# Patient Record
Sex: FEMALE | Race: WHITE | HISPANIC OR LATINO
[De-identification: names, ages, dates, MRNs, and addresses within clinical notes are randomized per-mention and may not be internally consistent; named-entity substitution may affect disease eponyms.]

---

## 2017-01-22 ENCOUNTER — TRANSCRIPTION ENCOUNTER (OUTPATIENT)
Age: 27
End: 2017-01-22

## 2017-02-21 ENCOUNTER — APPOINTMENT (OUTPATIENT)
Dept: INTERNAL MEDICINE | Facility: CLINIC | Age: 27
End: 2017-02-21

## 2017-02-21 VITALS
DIASTOLIC BLOOD PRESSURE: 58 MMHG | HEART RATE: 83 BPM | SYSTOLIC BLOOD PRESSURE: 102 MMHG | BODY MASS INDEX: 22.22 KG/M2 | WEIGHT: 135 LBS | OXYGEN SATURATION: 98 % | HEIGHT: 65.5 IN | TEMPERATURE: 98.7 F

## 2017-02-21 DIAGNOSIS — Z23 ENCOUNTER FOR IMMUNIZATION: ICD-10-CM

## 2017-02-24 ENCOUNTER — RX RENEWAL (OUTPATIENT)
Age: 27
End: 2017-02-24

## 2017-02-28 LAB
ADJUSTED MITOGEN: >10 IU/ML
ADJUSTED TB AG: 0.02 IU/ML
ALBUMIN SERPL ELPH-MCNC: 4.8 G/DL
ALP BLD-CCNC: 35 U/L
ALT SERPL-CCNC: 15 U/L
ANION GAP SERPL CALC-SCNC: 17 MMOL/L
APPEARANCE: CLEAR
AST SERPL-CCNC: 23 U/L
BASOPHILS # BLD AUTO: 0.02 K/UL
BASOPHILS NFR BLD AUTO: 0.4 %
BILIRUB SERPL-MCNC: 0.3 MG/DL
BILIRUBIN URINE: NEGATIVE
BLOOD URINE: NEGATIVE
BUN SERPL-MCNC: 12 MG/DL
CALCIUM SERPL-MCNC: 9.6 MG/DL
CHLORIDE SERPL-SCNC: 102 MMOL/L
CHOLEST SERPL-MCNC: 223 MG/DL
CHOLEST/HDLC SERPL: 2.3 RATIO
CO2 SERPL-SCNC: 20 MMOL/L
COLOR: YELLOW
CREAT SERPL-MCNC: 0.7 MG/DL
EOSINOPHIL # BLD AUTO: 0.08 K/UL
EOSINOPHIL NFR BLD AUTO: 1.7 %
GLUCOSE QUALITATIVE U: NORMAL MG/DL
GLUCOSE SERPL-MCNC: 67 MG/DL
HBA1C MFR BLD HPLC: 5.5 %
HBV SURFACE AB SER QL: REACTIVE
HCT VFR BLD CALC: 40.2 %
HDLC SERPL-MCNC: 96 MG/DL
HGB BLD-MCNC: 13 G/DL
HIV1+2 AB SPEC QL IA.RAPID: NONREACTIVE
IMM GRANULOCYTES NFR BLD AUTO: 0 %
KETONES URINE: NEGATIVE
LDLC SERPL CALC-MCNC: 112 MG/DL
LEUKOCYTE ESTERASE URINE: NEGATIVE
LYMPHOCYTES # BLD AUTO: 1.94 K/UL
LYMPHOCYTES NFR BLD AUTO: 40.8 %
M TB IFN-G BLD-IMP: NEGATIVE
MAN DIFF?: NORMAL
MCHC RBC-ENTMCNC: 30.4 PG
MCHC RBC-ENTMCNC: 32.3 GM/DL
MCV RBC AUTO: 94.1 FL
MEV IGG FLD QL IA: 32.1 AU/ML
MEV IGG+IGM SER-IMP: POSITIVE
MONOCYTES # BLD AUTO: 0.25 K/UL
MONOCYTES NFR BLD AUTO: 5.3 %
MUV AB SER-ACNC: POSITIVE
MUV IGG SER QL IA: 200 AU/ML
NEUTROPHILS # BLD AUTO: 2.47 K/UL
NEUTROPHILS NFR BLD AUTO: 51.8 %
NITRITE URINE: NEGATIVE
PH URINE: 5.5
PLATELET # BLD AUTO: 224 K/UL
POTASSIUM SERPL-SCNC: 3.9 MMOL/L
PROT SERPL-MCNC: 8 G/DL
PROTEIN URINE: NEGATIVE MG/DL
QUANTIFERON GOLD NIL: 0.04 IU/ML
RBC # BLD: 4.27 M/UL
RBC # FLD: 14.5 %
RUBV IGG FLD-ACNC: 2.1 INDEX
RUBV IGG SER-IMP: POSITIVE
SODIUM SERPL-SCNC: 139 MMOL/L
SPECIFIC GRAVITY URINE: 1.03
T4 FREE SERPL-MCNC: 1.5 NG/DL
TRIGL SERPL-MCNC: 75 MG/DL
TSH SERPL-ACNC: 1.18 UIU/ML
UROBILINOGEN URINE: NORMAL MG/DL
VZV AB TITR SER: POSITIVE
VZV IGG SER IF-ACNC: 180.7 INDEX
WBC # FLD AUTO: 4.76 K/UL

## 2017-03-02 ENCOUNTER — LABORATORY RESULT (OUTPATIENT)
Age: 27
End: 2017-03-02

## 2017-03-02 ENCOUNTER — OUTPATIENT (OUTPATIENT)
Dept: OUTPATIENT SERVICES | Facility: HOSPITAL | Age: 27
LOS: 1 days | End: 2017-03-02
Payer: MEDICAID

## 2017-03-02 ENCOUNTER — APPOINTMENT (OUTPATIENT)
Dept: OBGYN | Facility: CLINIC | Age: 27
End: 2017-03-02

## 2017-03-02 VITALS — DIASTOLIC BLOOD PRESSURE: 70 MMHG | BODY MASS INDEX: 22.12 KG/M2 | SYSTOLIC BLOOD PRESSURE: 120 MMHG | WEIGHT: 135 LBS

## 2017-03-02 DIAGNOSIS — N76.0 ACUTE VAGINITIS: ICD-10-CM

## 2017-03-02 PROCEDURE — G0463: CPT

## 2017-03-02 PROCEDURE — 86592 SYPHILIS TEST NON-TREP QUAL: CPT

## 2017-03-02 PROCEDURE — 87340 HEPATITIS B SURFACE AG IA: CPT

## 2017-03-02 PROCEDURE — 86803 HEPATITIS C AB TEST: CPT

## 2017-03-02 PROCEDURE — 87389 HIV-1 AG W/HIV-1&-2 AB AG IA: CPT

## 2017-03-03 DIAGNOSIS — Z11.3 ENCOUNTER FOR SCREENING FOR INFECTIONS WITH A PREDOMINANTLY SEXUAL MODE OF TRANSMISSION: ICD-10-CM

## 2017-03-03 DIAGNOSIS — Z30.40 ENCOUNTER FOR SURVEILLANCE OF CONTRACEPTIVES, UNSPECIFIED: ICD-10-CM

## 2017-03-03 LAB
C TRACH RRNA SPEC QL NAA+PROBE: SIGNIFICANT CHANGE UP
HBV SURFACE AG SER-ACNC: SIGNIFICANT CHANGE UP
HCV AB S/CO SERPL IA: 0.17 S/CO — SIGNIFICANT CHANGE UP
HCV AB SERPL-IMP: SIGNIFICANT CHANGE UP
HIV 1+2 AB+HIV1 P24 AG SERPL QL IA: SIGNIFICANT CHANGE UP
N GONORRHOEA RRNA SPEC QL NAA+PROBE: SIGNIFICANT CHANGE UP
RPR SERPL-ACNC: SIGNIFICANT CHANGE UP
SPECIMEN SOURCE: SIGNIFICANT CHANGE UP

## 2017-04-25 ENCOUNTER — MEDICATION RENEWAL (OUTPATIENT)
Age: 27
End: 2017-04-25

## 2017-05-23 ENCOUNTER — RX RENEWAL (OUTPATIENT)
Age: 27
End: 2017-05-23

## 2017-07-05 ENCOUNTER — APPOINTMENT (OUTPATIENT)
Dept: DERMATOLOGY | Facility: CLINIC | Age: 27
End: 2017-07-05

## 2017-07-05 VITALS
HEIGHT: 65.5 IN | BODY MASS INDEX: 22.22 KG/M2 | WEIGHT: 135 LBS | DIASTOLIC BLOOD PRESSURE: 60 MMHG | SYSTOLIC BLOOD PRESSURE: 110 MMHG

## 2017-07-05 DIAGNOSIS — R23.8 OTHER SKIN CHANGES: ICD-10-CM

## 2017-07-05 DIAGNOSIS — L72.9 FOLLICULAR CYST OF THE SKIN AND SUBCUTANEOUS TISSUE, UNSPECIFIED: ICD-10-CM

## 2017-07-17 ENCOUNTER — MEDICATION RENEWAL (OUTPATIENT)
Age: 27
End: 2017-07-17

## 2017-07-31 ENCOUNTER — TRANSCRIPTION ENCOUNTER (OUTPATIENT)
Age: 27
End: 2017-07-31

## 2017-08-29 ENCOUNTER — APPOINTMENT (OUTPATIENT)
Dept: DERMATOLOGY | Facility: CLINIC | Age: 27
End: 2017-08-29

## 2017-09-14 ENCOUNTER — APPOINTMENT (OUTPATIENT)
Dept: DERMATOLOGY | Facility: CLINIC | Age: 27
End: 2017-09-14
Payer: MEDICAID

## 2017-09-14 VITALS — DIASTOLIC BLOOD PRESSURE: 66 MMHG | SYSTOLIC BLOOD PRESSURE: 110 MMHG

## 2017-09-14 DIAGNOSIS — Z78.9 OTHER SPECIFIED HEALTH STATUS: ICD-10-CM

## 2017-09-14 PROCEDURE — 99213 OFFICE O/P EST LOW 20 MIN: CPT

## 2017-12-15 ENCOUNTER — APPOINTMENT (OUTPATIENT)
Dept: DERMATOLOGY | Facility: CLINIC | Age: 27
End: 2017-12-15
Payer: MEDICAID

## 2017-12-15 VITALS — SYSTOLIC BLOOD PRESSURE: 110 MMHG | DIASTOLIC BLOOD PRESSURE: 72 MMHG

## 2017-12-15 DIAGNOSIS — Z86.69 PERSONAL HISTORY OF OTHER DISEASES OF THE NERVOUS SYSTEM AND SENSE ORGANS: ICD-10-CM

## 2017-12-15 DIAGNOSIS — Z87.2 PERSONAL HISTORY OF DISEASES OF THE SKIN AND SUBCUTANEOUS TISSUE: ICD-10-CM

## 2017-12-15 PROCEDURE — 99213 OFFICE O/P EST LOW 20 MIN: CPT | Mod: GC

## 2017-12-21 ENCOUNTER — MEDICATION RENEWAL (OUTPATIENT)
Age: 27
End: 2017-12-21

## 2018-03-14 ENCOUNTER — LABORATORY RESULT (OUTPATIENT)
Age: 28
End: 2018-03-14

## 2018-03-14 ENCOUNTER — OUTPATIENT (OUTPATIENT)
Dept: OUTPATIENT SERVICES | Facility: HOSPITAL | Age: 28
LOS: 1 days | End: 2018-03-14
Payer: COMMERCIAL

## 2018-03-14 ENCOUNTER — APPOINTMENT (OUTPATIENT)
Dept: OBGYN | Facility: CLINIC | Age: 28
End: 2018-03-14
Payer: MEDICAID

## 2018-03-14 VITALS — SYSTOLIC BLOOD PRESSURE: 110 MMHG | WEIGHT: 123 LBS | BODY MASS INDEX: 20.16 KG/M2 | DIASTOLIC BLOOD PRESSURE: 72 MMHG

## 2018-03-14 DIAGNOSIS — Z30.40 ENCOUNTER FOR SURVEILLANCE OF CONTRACEPTIVES, UNSPECIFIED: ICD-10-CM

## 2018-03-14 DIAGNOSIS — N76.0 ACUTE VAGINITIS: ICD-10-CM

## 2018-03-14 DIAGNOSIS — Z11.3 ENCOUNTER FOR SCREENING FOR INFECTIONS WITH A PREDOMINANTLY SEXUAL MODE OF TRANSMISSION: ICD-10-CM

## 2018-03-14 DIAGNOSIS — Z01.419 ENCOUNTER FOR GYNECOLOGICAL EXAMINATION (GENERAL) (ROUTINE) W/OUT ABNORMAL FINDINGS: ICD-10-CM

## 2018-03-14 DIAGNOSIS — Z01.419 ENCOUNTER FOR GYNECOLOGICAL EXAMINATION (GENERAL) (ROUTINE) WITHOUT ABNORMAL FINDINGS: ICD-10-CM

## 2018-03-14 DIAGNOSIS — Z87.42 PERSONAL HISTORY OF OTHER DISEASES OF THE FEMALE GENITAL TRACT: ICD-10-CM

## 2018-03-14 PROCEDURE — 87389 HIV-1 AG W/HIV-1&-2 AB AG IA: CPT

## 2018-03-14 PROCEDURE — G0463: CPT

## 2018-03-14 PROCEDURE — 86780 TREPONEMA PALLIDUM: CPT

## 2018-03-14 PROCEDURE — 99395 PREV VISIT EST AGE 18-39: CPT | Mod: NC

## 2018-03-14 RX ORDER — ADAPALENE 1 MG/G
0.1 GEL TOPICAL
Qty: 1 | Refills: 2 | Status: DISCONTINUED | COMMUNITY
Start: 2017-09-14 | End: 2018-03-14

## 2018-03-14 RX ORDER — TRETINOIN 0.5 MG/G
0.05 CREAM TOPICAL
Qty: 1 | Refills: 3 | Status: DISCONTINUED | COMMUNITY
Start: 2017-12-15 | End: 2018-03-14

## 2018-03-15 LAB
C TRACH RRNA SPEC QL NAA+PROBE: SIGNIFICANT CHANGE UP
HIV 1+2 AB+HIV1 P24 AG SERPL QL IA: SIGNIFICANT CHANGE UP
N GONORRHOEA RRNA SPEC QL NAA+PROBE: SIGNIFICANT CHANGE UP
SPECIMEN SOURCE: SIGNIFICANT CHANGE UP
T PALLIDUM AB TITR SER: NEGATIVE — SIGNIFICANT CHANGE UP

## 2018-03-16 ENCOUNTER — APPOINTMENT (OUTPATIENT)
Dept: DERMATOLOGY | Facility: CLINIC | Age: 28
End: 2018-03-16
Payer: COMMERCIAL

## 2018-03-16 VITALS — DIASTOLIC BLOOD PRESSURE: 76 MMHG | SYSTOLIC BLOOD PRESSURE: 110 MMHG

## 2018-03-16 DIAGNOSIS — L70.9 ACNE, UNSPECIFIED: ICD-10-CM

## 2018-03-16 PROCEDURE — 99213 OFFICE O/P EST LOW 20 MIN: CPT

## 2018-03-17 LAB — CYTOLOGY SPEC DOC CYTO: SIGNIFICANT CHANGE UP

## 2018-03-20 ENCOUNTER — TRANSCRIPTION ENCOUNTER (OUTPATIENT)
Age: 28
End: 2018-03-20

## 2018-03-20 ENCOUNTER — LABORATORY RESULT (OUTPATIENT)
Age: 28
End: 2018-03-20

## 2018-03-20 ENCOUNTER — APPOINTMENT (OUTPATIENT)
Dept: INTERNAL MEDICINE | Facility: CLINIC | Age: 28
End: 2018-03-20
Payer: COMMERCIAL

## 2018-03-20 VITALS
HEIGHT: 66.5 IN | BODY MASS INDEX: 19.85 KG/M2 | HEART RATE: 79 BPM | SYSTOLIC BLOOD PRESSURE: 104 MMHG | OXYGEN SATURATION: 99 % | DIASTOLIC BLOOD PRESSURE: 60 MMHG | TEMPERATURE: 98.4 F | WEIGHT: 125 LBS

## 2018-03-20 DIAGNOSIS — Z11.1 ENCOUNTER FOR SCREENING FOR RESPIRATORY TUBERCULOSIS: ICD-10-CM

## 2018-03-20 PROCEDURE — 36415 COLL VENOUS BLD VENIPUNCTURE: CPT

## 2018-03-20 PROCEDURE — 99395 PREV VISIT EST AGE 18-39: CPT | Mod: 25

## 2018-05-18 ENCOUNTER — APPOINTMENT (OUTPATIENT)
Dept: DERMATOLOGY | Facility: CLINIC | Age: 28
End: 2018-05-18

## 2018-05-28 ENCOUNTER — EMERGENCY (EMERGENCY)
Facility: HOSPITAL | Age: 28
LOS: 1 days | Discharge: HOME | End: 2018-05-28
Payer: COMMERCIAL

## 2018-05-28 VITALS
OXYGEN SATURATION: 97 % | DIASTOLIC BLOOD PRESSURE: 72 MMHG | RESPIRATION RATE: 15 BRPM | TEMPERATURE: 98 F | SYSTOLIC BLOOD PRESSURE: 123 MMHG | HEART RATE: 78 BPM

## 2018-05-28 PROCEDURE — 99282 EMERGENCY DEPT VISIT SF MDM: CPT

## 2018-05-28 NOTE — ED PROVIDER NOTE - MEDICAL DECISION MAKING DETAILS
28yo female no pmh presenting with swelling in left hand that is described as spherical area of swelling, warmth and tenderness in palmar aspect of left hand. Currently much improved, mild swelling in vein, mildly tender. Possible hematoma vs cyst/ Appears well, no other symptoms. DC with hand follow-up and return precautions.

## 2018-05-28 NOTE — ED PROVIDER NOTE - OBJECTIVE STATEMENT
26yo female no pmh presenting with swelling in left hand. She was folding clothes and at 2pm had acute onset swelling in what she thinks was the vein in her hand. There was a marble sized, firm area of swelling with vein coloration in palm that was painful, tender, warm, and limited ROM of hand. Massaged and placed ice and the area gradually went back to normal sized. Now, less tender, normal temp, thinks its just slightly more swollen than opposite hand.  denies numbness tingling, sob, chest pain.

## 2018-05-28 NOTE — ED ADULT NURSE NOTE - OBJECTIVE STATEMENT
27 year old female presents to the ED complaining of left hand pain and swelling, Patient seen treated and discharged by Alvin NUNEZ seen ambulatory in ED, VSS, Pt resting comfortably showing no SS of distress.

## 2018-05-28 NOTE — ED PROVIDER NOTE - ATTENDING CONTRIBUTION TO CARE
MD Mclaughlin:  patient seen and evaluated with the resident.  I was present for key portions of the History & Physical, and I agree with the Impression & Plan.  MD Mclaughlin:  27 F c/o transient swelling and minor pain in the palm of her L hand (she is RHD).  Was folding clothes when she spontaneously developed a painful round swelling over the volar aspect of her index finger flexor tendon, in the metacarpal distribution.  Onset of swelling was rapid, and abated with massage, however the pain and minor swelling persisted.  Patient does not take blood thinners, ASA, and has no Hx bleeding gums or heavy menses.  VS - wnl.  on exam:  grossly L hand is normal.  Upon closer inspection, there is mild swelling over the volar aspect of the index finger flexor tendon, along the metacarpal length of the tendon.  Her FDS and FDP are intact,  strength 5/5.  Impression:  spontaneous hematoma in hand, resolving vs ganglion cyst - resolving.  Plan:  NSAIDs, return precautions, f/u hand.

## 2018-07-10 LAB
ADJUSTED MITOGEN: >10 IU/ML
ADJUSTED TB AG: 0.05 IU/ML
ALBUMIN SERPL ELPH-MCNC: 4.5 G/DL
ALP BLD-CCNC: 28 U/L
ALT SERPL-CCNC: 15 U/L
ANION GAP SERPL CALC-SCNC: 14 MMOL/L
APPEARANCE: CLEAR
AST SERPL-CCNC: 21 U/L
BASOPHILS # BLD AUTO: 0.03 K/UL
BASOPHILS NFR BLD AUTO: 0.6 %
BILIRUB SERPL-MCNC: 0.4 MG/DL
BILIRUBIN URINE: NEGATIVE
BLOOD URINE: ABNORMAL
BUN SERPL-MCNC: 14 MG/DL
CALCIUM SERPL-MCNC: 9.2 MG/DL
CHLORIDE SERPL-SCNC: 102 MMOL/L
CHOLEST SERPL-MCNC: 204 MG/DL
CHOLEST/HDLC SERPL: 2.2 RATIO
CO2 SERPL-SCNC: 23 MMOL/L
COLOR: YELLOW
CREAT SERPL-MCNC: 0.76 MG/DL
EOSINOPHIL # BLD AUTO: 0.07 K/UL
EOSINOPHIL NFR BLD AUTO: 1.5 %
GLUCOSE QUALITATIVE U: NEGATIVE MG/DL
GLUCOSE SERPL-MCNC: 69 MG/DL
HBA1C MFR BLD HPLC: 5.3 %
HCT VFR BLD CALC: 38.8 %
HDLC SERPL-MCNC: 92 MG/DL
HGB BLD-MCNC: 12.1 G/DL
IMM GRANULOCYTES NFR BLD AUTO: 0 %
KETONES URINE: NEGATIVE
LDLC SERPL CALC-MCNC: 97 MG/DL
LEUKOCYTE ESTERASE URINE: NEGATIVE
LYMPHOCYTES # BLD AUTO: 1.91 K/UL
LYMPHOCYTES NFR BLD AUTO: 41 %
M TB IFN-G BLD-IMP: NEGATIVE
MAN DIFF?: NORMAL
MCHC RBC-ENTMCNC: 30.7 PG
MCHC RBC-ENTMCNC: 31.2 GM/DL
MCV RBC AUTO: 98.5 FL
MEV IGG FLD QL IA: 21.5 AU/ML
MEV IGG+IGM SER-IMP: NEGATIVE
MONOCYTES # BLD AUTO: 0.3 K/UL
MONOCYTES NFR BLD AUTO: 6.4 %
MUV AB SER-ACNC: POSITIVE
MUV IGG SER QL IA: 146 AU/ML
NEUTROPHILS # BLD AUTO: 2.35 K/UL
NEUTROPHILS NFR BLD AUTO: 50.5 %
NITRITE URINE: NEGATIVE
PH URINE: 5.5
PLATELET # BLD AUTO: 193 K/UL
POTASSIUM SERPL-SCNC: 4.5 MMOL/L
PROT SERPL-MCNC: 7.2 G/DL
PROTEIN URINE: NEGATIVE MG/DL
QUANTIFERON GOLD NIL: 0.04 IU/ML
RBC # BLD: 3.94 M/UL
RBC # FLD: 14 %
RUBV IGG FLD-ACNC: 1.8 INDEX
RUBV IGG SER-IMP: POSITIVE
SODIUM SERPL-SCNC: 139 MMOL/L
SPECIFIC GRAVITY URINE: 1.03
TRIGL SERPL-MCNC: 73 MG/DL
TSH SERPL-ACNC: 1.33 UIU/ML
UROBILINOGEN URINE: NEGATIVE MG/DL
VZV AB TITR SER: POSITIVE
VZV IGG SER IF-ACNC: 192.2 INDEX
WBC # FLD AUTO: 4.66 K/UL

## 2018-08-17 ENCOUNTER — APPOINTMENT (OUTPATIENT)
Dept: INTERNAL MEDICINE | Facility: CLINIC | Age: 28
End: 2018-08-17
Payer: COMMERCIAL

## 2018-08-17 DIAGNOSIS — Z23 ENCOUNTER FOR IMMUNIZATION: ICD-10-CM

## 2018-08-17 PROCEDURE — 90471 IMMUNIZATION ADMIN: CPT

## 2018-08-17 PROCEDURE — 90707 MMR VACCINE SC: CPT

## 2018-08-23 ENCOUNTER — MEDICATION RENEWAL (OUTPATIENT)
Age: 28
End: 2018-08-23

## 2019-01-08 ENCOUNTER — APPOINTMENT (OUTPATIENT)
Dept: INTERNAL MEDICINE | Facility: CLINIC | Age: 29
End: 2019-01-08
Payer: COMMERCIAL

## 2019-01-08 PROCEDURE — 36415 COLL VENOUS BLD VENIPUNCTURE: CPT

## 2019-01-09 LAB
MEV IGG FLD QL IA: 173 AU/ML
MEV IGG+IGM SER-IMP: POSITIVE
MUV AB SER-ACNC: POSITIVE
MUV IGG SER QL IA: 284 AU/ML
RUBV IGG FLD-ACNC: 4.3 INDEX
RUBV IGG SER-IMP: POSITIVE

## 2019-04-16 ENCOUNTER — APPOINTMENT (OUTPATIENT)
Dept: OBGYN | Facility: CLINIC | Age: 29
End: 2019-04-16
Payer: COMMERCIAL

## 2019-04-16 VITALS
HEIGHT: 66 IN | SYSTOLIC BLOOD PRESSURE: 115 MMHG | WEIGHT: 125 LBS | BODY MASS INDEX: 20.09 KG/M2 | DIASTOLIC BLOOD PRESSURE: 80 MMHG

## 2019-04-16 DIAGNOSIS — Z78.9 OTHER SPECIFIED HEALTH STATUS: ICD-10-CM

## 2019-04-16 DIAGNOSIS — Z01.419 ENCOUNTER FOR GYNECOLOGICAL EXAMINATION (GENERAL) (ROUTINE) W/OUT ABNORMAL FINDINGS: ICD-10-CM

## 2019-04-16 DIAGNOSIS — N63.10 UNSPECIFIED LUMP IN THE RIGHT BREAST, UNSPECIFIED QUADRANT: ICD-10-CM

## 2019-04-16 PROCEDURE — 99395 PREV VISIT EST AGE 18-39: CPT

## 2019-04-16 PROCEDURE — 99213 OFFICE O/P EST LOW 20 MIN: CPT | Mod: 25

## 2019-04-16 NOTE — PHYSICAL EXAM
[Awake] : awake [Alert] : alert [Mass] : breast mass [Soft] : soft [Oriented x3] : oriented to person, place, and time [Normal] : uterus [Uterine Adnexae] : were not tender and not enlarged [No Bleeding] : there was no active vaginal bleeding [RRR, No Murmurs] : RRR, no murmurs [CTAB] : CTAB [Acute Distress] : no acute distress [Goiter] : no goiter [Nipple Discharge] : no nipple discharge [Tender] : non tender [Axillary LAD] : no axillary lymphadenopathy [Distended] : not distended [Depressed Mood] : not depressed [Adnexa Tenderness] : were not tender [Flat Affect] : affect not flat [Ovarian Mass (___ Cm)] : there were no adnexal masses

## 2019-04-19 ENCOUNTER — APPOINTMENT (OUTPATIENT)
Dept: ULTRASOUND IMAGING | Facility: CLINIC | Age: 29
End: 2019-04-19

## 2019-06-24 ENCOUNTER — LABORATORY RESULT (OUTPATIENT)
Age: 29
End: 2019-06-24

## 2019-06-24 ENCOUNTER — NON-APPOINTMENT (OUTPATIENT)
Age: 29
End: 2019-06-24

## 2019-06-24 ENCOUNTER — APPOINTMENT (OUTPATIENT)
Dept: INTERNAL MEDICINE | Facility: CLINIC | Age: 29
End: 2019-06-24
Payer: COMMERCIAL

## 2019-06-24 VITALS
BODY MASS INDEX: 20.73 KG/M2 | HEART RATE: 92 BPM | HEIGHT: 66 IN | TEMPERATURE: 98.6 F | DIASTOLIC BLOOD PRESSURE: 64 MMHG | OXYGEN SATURATION: 99 % | SYSTOLIC BLOOD PRESSURE: 112 MMHG | WEIGHT: 129 LBS

## 2019-06-24 PROCEDURE — 93000 ELECTROCARDIOGRAM COMPLETE: CPT

## 2019-06-24 PROCEDURE — 36415 COLL VENOUS BLD VENIPUNCTURE: CPT

## 2019-06-24 PROCEDURE — G0444 DEPRESSION SCREEN ANNUAL: CPT

## 2019-06-24 PROCEDURE — 99395 PREV VISIT EST AGE 18-39: CPT | Mod: 25

## 2019-06-24 RX ORDER — NORGESTIMATE AND ETHINYL ESTRADIOL 7DAYSX3 28
0.18/0.215/0.25 KIT ORAL
Qty: 90 | Refills: 3 | Status: DISCONTINUED | COMMUNITY
Start: 2019-04-16 | End: 2019-06-24

## 2019-06-24 RX ORDER — VALACYCLOVIR 1 G/1
1 TABLET, FILM COATED ORAL
Qty: 16 | Refills: 0 | Status: DISCONTINUED | COMMUNITY
Start: 2018-03-16 | End: 2019-06-24

## 2019-06-24 RX ORDER — SPIRONOLACTONE 50 MG/1
50 TABLET ORAL
Qty: 1 | Refills: 1 | Status: DISCONTINUED | COMMUNITY
Start: 2018-03-16 | End: 2019-06-24

## 2019-06-24 RX ORDER — NORGESTIMATE AND ETHINYL ESTRADIOL 7DAYSX3 28
0.18/0.215/0.25 KIT ORAL
Qty: 1 | Refills: 6 | Status: DISCONTINUED | COMMUNITY
Start: 2017-02-24 | End: 2019-06-24

## 2019-06-24 NOTE — HISTORY OF PRESENT ILLNESS
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 she feels well w no new concerns. she has now stopped OCP, may plan to achieve pregnancy in the coming year. not taking any rx currently. 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 she had repeat MMR titers earlier this year showing immunity after vaccination.

## 2019-06-24 NOTE — HEALTH RISK ASSESSMENT
[No falls in past year] : Patient reported no falls in the past year [0] : 2) Feeling down, depressed, or hopeless: Not at all (0) [No] : No [Patient reported PAP Smear was normal] : Patient reported PAP Smear was normal [None] : None [Student] : student [] :  [# Of Children ___] : has [unfilled] children [Sexually Active] : sexually active [] : No [PapSmearDate] : 04/19

## 2019-06-24 NOTE — ASSESSMENT
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 repeat MMR titers showed immunity after vaccine given in 8/18. \par check quantiferon tb screening \par \par routine diet, exercise advised \par \par cont routine yearly GYN screening, she has now stopped OCP \par \par Tdap vaccine UTD \par \par RTO yearly for routine exam or earlier prn if any new concerns

## 2019-06-24 NOTE — PHYSICAL EXAM
[No Acute Distress] : no acute distress [Well Developed] : well developed [Well Nourished] : well nourished [Normal Voice/Communication] : normal voice/communication [Well-Appearing] : well-appearing [Normal Sclera/Conjunctiva] : normal sclera/conjunctiva [PERRL] : pupils equal round and reactive to light [Normal Oropharynx] : the oropharynx was normal [Normal Outer Ear/Nose] : the outer ears and nose were normal in appearance [Normal TMs] : both tympanic membranes were normal [No JVD] : no jugular venous distention [No Lymphadenopathy] : no lymphadenopathy [Supple] : supple [No Respiratory Distress] : no respiratory distress  [Clear to Auscultation] : lungs were clear to auscultation bilaterally [Thyroid Normal, No Nodules] : the thyroid was normal and there were no nodules present [No Accessory Muscle Use] : no accessory muscle use [Normal Rate] : normal rate  [Regular Rhythm] : with a regular rhythm [Normal S1, S2] : normal S1 and S2 [No Murmur] : no murmur heard [No Abdominal Bruit] : a ~M bruit was not heard ~T in the abdomen [No Varicosities] : no varicosities [No Carotid Bruits] : no carotid bruits [No Extremity Clubbing/Cyanosis] : no extremity clubbing/cyanosis [No Edema] : there was no peripheral edema [Pedal Pulses Present] : the pedal pulses are present [Declined Breast Exam] : declined breast exam  [Soft] : abdomen soft [Non Tender] : non-tender [Non-distended] : non-distended [No Masses] : no abdominal mass palpated [Normal Bowel Sounds] : normal bowel sounds [Normal Supraclavicular Nodes] : no supraclavicular lymphadenopathy [No HSM] : no HSM [Normal Posterior Cervical Nodes] : no posterior cervical lymphadenopathy [Normal Anterior Cervical Nodes] : no anterior cervical lymphadenopathy [No Joint Swelling] : no joint swelling [No Spinal Tenderness] : no spinal tenderness [Grossly Normal Strength/Tone] : grossly normal strength/tone [Normal Gait] : normal gait [No Rash] : no rash [Coordination Grossly Intact] : coordination grossly intact [No Focal Deficits] : no focal deficits [Normal Affect] : the affect was normal [Speech Grossly Normal] : speech grossly normal [Normal Insight/Judgement] : insight and judgment were intact [Normal Mood] : the mood was normal

## 2019-07-09 LAB
ALBUMIN SERPL ELPH-MCNC: 4.2 G/DL
ALP BLD-CCNC: 37 U/L
ALT SERPL-CCNC: 16 U/L
ANION GAP SERPL CALC-SCNC: 12 MMOL/L
APPEARANCE: ABNORMAL
AST SERPL-CCNC: 15 U/L
BASOPHILS # BLD AUTO: 0.04 K/UL
BASOPHILS NFR BLD AUTO: 0.5 %
BILIRUB SERPL-MCNC: 0.2 MG/DL
BILIRUBIN URINE: NEGATIVE
BLOOD URINE: NEGATIVE
BUN SERPL-MCNC: 14 MG/DL
CALCIUM SERPL-MCNC: 9.7 MG/DL
CHLORIDE SERPL-SCNC: 106 MMOL/L
CHOLEST SERPL-MCNC: 190 MG/DL
CHOLEST/HDLC SERPL: 2.1 RATIO
CO2 SERPL-SCNC: 20 MMOL/L
COLOR: NORMAL
CREAT SERPL-MCNC: 0.64 MG/DL
EOSINOPHIL # BLD AUTO: 0.16 K/UL
EOSINOPHIL NFR BLD AUTO: 1.8 %
ESTIMATED AVERAGE GLUCOSE: 108 MG/DL
GLUCOSE QUALITATIVE U: NEGATIVE
GLUCOSE SERPL-MCNC: 89 MG/DL
HBA1C MFR BLD HPLC: 5.4 %
HBV SURFACE AB SER QL: REACTIVE
HCT VFR BLD CALC: 32.5 %
HDLC SERPL-MCNC: 89 MG/DL
HGB BLD-MCNC: 10.5 G/DL
IMM GRANULOCYTES NFR BLD AUTO: 0.3 %
KETONES URINE: NEGATIVE
LDLC SERPL CALC-MCNC: 90 MG/DL
LEUKOCYTE ESTERASE URINE: ABNORMAL
LYMPHOCYTES # BLD AUTO: 1.87 K/UL
LYMPHOCYTES NFR BLD AUTO: 21.2 %
M TB IFN-G BLD-IMP: NEGATIVE
MAN DIFF?: NORMAL
MCHC RBC-ENTMCNC: 31.2 PG
MCHC RBC-ENTMCNC: 32.3 GM/DL
MCV RBC AUTO: 96.4 FL
MONOCYTES # BLD AUTO: 0.68 K/UL
MONOCYTES NFR BLD AUTO: 7.7 %
NEUTROPHILS # BLD AUTO: 6.03 K/UL
NEUTROPHILS NFR BLD AUTO: 68.5 %
NITRITE URINE: NEGATIVE
PH URINE: 6
PLATELET # BLD AUTO: 230 K/UL
POTASSIUM SERPL-SCNC: 4.2 MMOL/L
PROT SERPL-MCNC: 6.6 G/DL
PROTEIN URINE: NORMAL
QUANTIFERON TB PLUS MITOGEN MINUS NIL: 9.96 IU/ML
QUANTIFERON TB PLUS NIL: 0.02 IU/ML
QUANTIFERON TB PLUS TB1 MINUS NIL: 0.08 IU/ML
QUANTIFERON TB PLUS TB2 MINUS NIL: 0.02 IU/ML
RBC # BLD: 3.37 M/UL
RBC # FLD: 13.1 %
SODIUM SERPL-SCNC: 138 MMOL/L
SPECIFIC GRAVITY URINE: 1.01
TRIGL SERPL-MCNC: 56 MG/DL
TSH SERPL-ACNC: 0.02 UIU/ML
UROBILINOGEN URINE: NORMAL
WBC # FLD AUTO: 8.81 K/UL

## 2019-07-15 ENCOUNTER — MOBILE ON CALL (OUTPATIENT)
Age: 29
End: 2019-07-15

## 2019-07-15 DIAGNOSIS — Z32.01 ENCOUNTER FOR PREGNANCY TEST, RESULT POSITIVE: ICD-10-CM

## 2019-07-17 LAB
HCG SERPL-MCNC: ABNORMAL MIU/ML
PROGEST SERPL-MCNC: 27.4 NG/ML

## 2019-07-26 ENCOUNTER — APPOINTMENT (OUTPATIENT)
Dept: OBGYN | Facility: CLINIC | Age: 29
End: 2019-07-26
Payer: COMMERCIAL

## 2019-07-26 ENCOUNTER — ASOB RESULT (OUTPATIENT)
Age: 29
End: 2019-07-26

## 2019-07-26 ENCOUNTER — APPOINTMENT (OUTPATIENT)
Dept: ANTEPARTUM | Facility: CLINIC | Age: 29
End: 2019-07-26
Payer: COMMERCIAL

## 2019-07-26 ENCOUNTER — NON-APPOINTMENT (OUTPATIENT)
Age: 29
End: 2019-07-26

## 2019-07-26 VITALS — SYSTOLIC BLOOD PRESSURE: 114 MMHG | BODY MASS INDEX: 20.66 KG/M2 | DIASTOLIC BLOOD PRESSURE: 60 MMHG | WEIGHT: 128 LBS

## 2019-07-26 PROCEDURE — 76805 OB US >/= 14 WKS SNGL FETUS: CPT

## 2019-07-26 PROCEDURE — 0501F PRENATAL FLOW SHEET: CPT

## 2019-07-28 ENCOUNTER — NON-APPOINTMENT (OUTPATIENT)
Age: 29
End: 2019-07-28

## 2019-07-28 LAB
BACTERIA UR CULT: NORMAL
BASOPHILS # BLD AUTO: 0.03 K/UL
BASOPHILS NFR BLD AUTO: 0.4 %
C TRACH RRNA SPEC QL NAA+PROBE: NOT DETECTED
CMV IGG SERPL QL: <0.2 U/ML
CMV IGG SERPL-IMP: NEGATIVE
CMV IGM SERPL QL: <8 AU/ML
CMV IGM SERPL QL: NEGATIVE
EOSINOPHIL # BLD AUTO: 0.13 K/UL
EOSINOPHIL NFR BLD AUTO: 1.7 %
HBV SURFACE AG SER QL: NONREACTIVE
HCT VFR BLD CALC: 31.1 %
HCV AB SER QL: NONREACTIVE
HCV S/CO RATIO: 0.13 S/CO
HGB BLD-MCNC: 10.4 G/DL
HIV1+2 AB SPEC QL IA.RAPID: NONREACTIVE
IMM GRANULOCYTES NFR BLD AUTO: 0.4 %
LYMPHOCYTES # BLD AUTO: 1.75 K/UL
LYMPHOCYTES NFR BLD AUTO: 22.4 %
MAN DIFF?: NORMAL
MCHC RBC-ENTMCNC: 31.3 PG
MCHC RBC-ENTMCNC: 33.4 GM/DL
MCV RBC AUTO: 93.7 FL
MEV IGG FLD QL IA: 88.8 AU/ML
MEV IGG+IGM SER-IMP: POSITIVE
MONOCYTES # BLD AUTO: 0.51 K/UL
MONOCYTES NFR BLD AUTO: 6.5 %
N GONORRHOEA RRNA SPEC QL NAA+PROBE: NOT DETECTED
NEUTROPHILS # BLD AUTO: 5.35 K/UL
NEUTROPHILS NFR BLD AUTO: 68.6 %
PLATELET # BLD AUTO: 249 K/UL
RBC # BLD: 3.32 M/UL
RBC # FLD: 12.8 %
RUBV IGG FLD-ACNC: 2.6 INDEX
RUBV IGG SER-IMP: POSITIVE
SOURCE AMPLIFICATION: NORMAL
T PALLIDUM AB SER QL IA: NEGATIVE
TSH SERPL-ACNC: 0.01 UIU/ML
VZV AB TITR SER: NEGATIVE
VZV IGG SER IF-ACNC: 127.6 INDEX
WBC # FLD AUTO: 7.8 K/UL

## 2019-07-29 LAB
ABO + RH PNL BLD: NORMAL
BLD GP AB SCN SERPL QL: NORMAL
LEAD BLD-MCNC: <1 UG/DL

## 2019-07-30 LAB
HGB A MFR BLD: 97.4 %
HGB A2 MFR BLD: 2.6 %
HGB FRACT BLD-IMP: NORMAL

## 2019-08-01 LAB — FMR1 GENE MUT ANL BLD/T: NORMAL

## 2019-08-02 LAB
B19V IGG SER QL IA: 4.4 INDEX
B19V IGG+IGM SER-IMP: NORMAL
B19V IGG+IGM SER-IMP: POSITIVE
B19V IGM FLD-ACNC: 0.2
B19V IGM SER-ACNC: NEGATIVE

## 2019-08-04 LAB
AR GENE MUT ANL BLD/T: NEGATIVE
CFTR MUT TESTED BLD/T: NEGATIVE

## 2019-08-05 ENCOUNTER — NON-APPOINTMENT (OUTPATIENT)
Age: 29
End: 2019-08-05

## 2019-08-05 LAB
CLARI ADDITIONAL INFO: NORMAL
CLARI CHROMOSOME 13: NORMAL
CLARI CHROMOSOME 18: NORMAL
CLARI CHROMOSOME 21: NORMAL
CLARI SEX CHROMOSOMES: NORMAL
CLARITEST NIPT: NORMAL

## 2019-08-09 LAB — GENE DIS ANL CARRIER INTERP-IMP: NEGATIVE

## 2019-08-15 ENCOUNTER — APPOINTMENT (OUTPATIENT)
Dept: OBGYN | Facility: CLINIC | Age: 29
End: 2019-08-15
Payer: COMMERCIAL

## 2019-08-15 ENCOUNTER — NON-APPOINTMENT (OUTPATIENT)
Age: 29
End: 2019-08-15

## 2019-08-15 VITALS
SYSTOLIC BLOOD PRESSURE: 111 MMHG | WEIGHT: 130 LBS | HEIGHT: 66 IN | BODY MASS INDEX: 20.89 KG/M2 | DIASTOLIC BLOOD PRESSURE: 70 MMHG

## 2019-08-15 DIAGNOSIS — Z34.02 ENCOUNTER FOR SUPERVISION OF NORMAL FIRST PREGNANCY, SECOND TRIMESTER: ICD-10-CM

## 2019-08-15 PROCEDURE — 0502F SUBSEQUENT PRENATAL CARE: CPT

## 2019-08-29 DIAGNOSIS — O28.0 ABNORMAL HEMATOLOGICAL FINDING ON ANTENATAL SCREENING OF MOTHER: ICD-10-CM

## 2019-08-29 LAB
2ND TRIMESTER DATA: NORMAL
ADDENDUM DOC: NORMAL
AFP PNL SERPL: NORMAL
AFP SERPL-ACNC: NORMAL
B-HCG FREE SERPL-MCNC: NORMAL
CLINICAL BIOCHEMIST REVIEW: ABNORMAL
CLINICAL BIOCHEMIST REVIEW: NORMAL
CLINICAL BIOCHEMIST REVIEW: NORMAL
INHIBIN A SERPL-MCNC: NORMAL
Lab: NORMAL
NOTES NTD: NORMAL
T3 SERPL-MCNC: 132 NG/DL
T3FREE SERPL-MCNC: 2.4 PG/ML
T4 FREE SERPL-MCNC: 0.9 NG/DL
T4 SERPL-MCNC: 7.6 UG/DL
TSH SERPL-ACNC: 0.36 UIU/ML
U ESTRIOL SERPL-SCNC: NORMAL

## 2019-09-03 ENCOUNTER — APPOINTMENT (OUTPATIENT)
Dept: PEDIATRIC MEDICAL GENETICS | Facility: CLINIC | Age: 29
End: 2019-09-03
Payer: COMMERCIAL

## 2019-09-03 PROCEDURE — 99212 OFFICE O/P EST SF 10 MIN: CPT

## 2019-09-11 ENCOUNTER — APPOINTMENT (OUTPATIENT)
Dept: ANTEPARTUM | Facility: CLINIC | Age: 29
End: 2019-09-11
Payer: COMMERCIAL

## 2019-09-11 ENCOUNTER — ASOB RESULT (OUTPATIENT)
Age: 29
End: 2019-09-11

## 2019-09-11 PROCEDURE — 76817 TRANSVAGINAL US OBSTETRIC: CPT

## 2019-09-11 PROCEDURE — 76811 OB US DETAILED SNGL FETUS: CPT

## 2019-09-20 ENCOUNTER — APPOINTMENT (OUTPATIENT)
Dept: OBGYN | Facility: CLINIC | Age: 29
End: 2019-09-20
Payer: COMMERCIAL

## 2019-09-20 ENCOUNTER — NON-APPOINTMENT (OUTPATIENT)
Age: 29
End: 2019-09-20

## 2019-09-20 VITALS — DIASTOLIC BLOOD PRESSURE: 60 MMHG | BODY MASS INDEX: 21.14 KG/M2 | SYSTOLIC BLOOD PRESSURE: 110 MMHG | WEIGHT: 131 LBS

## 2019-09-20 PROCEDURE — 90471 IMMUNIZATION ADMIN: CPT

## 2019-09-20 PROCEDURE — 90686 IIV4 VACC NO PRSV 0.5 ML IM: CPT

## 2019-09-20 PROCEDURE — 0502F SUBSEQUENT PRENATAL CARE: CPT

## 2019-09-25 ENCOUNTER — APPOINTMENT (OUTPATIENT)
Dept: OBGYN | Facility: CLINIC | Age: 29
End: 2019-09-25

## 2019-09-25 ENCOUNTER — OTHER (OUTPATIENT)
Age: 29
End: 2019-09-25

## 2019-09-25 ENCOUNTER — CLINICAL ADVICE (OUTPATIENT)
Age: 29
End: 2019-09-25

## 2019-10-10 ENCOUNTER — ASOB RESULT (OUTPATIENT)
Age: 29
End: 2019-10-10

## 2019-10-10 ENCOUNTER — APPOINTMENT (OUTPATIENT)
Dept: ANTEPARTUM | Facility: CLINIC | Age: 29
End: 2019-10-10
Payer: COMMERCIAL

## 2019-10-10 PROCEDURE — 76816 OB US FOLLOW-UP PER FETUS: CPT

## 2019-10-18 ENCOUNTER — APPOINTMENT (OUTPATIENT)
Dept: OBGYN | Facility: CLINIC | Age: 29
End: 2019-10-18
Payer: COMMERCIAL

## 2019-10-18 VITALS
SYSTOLIC BLOOD PRESSURE: 102 MMHG | WEIGHT: 146 LBS | BODY MASS INDEX: 23.46 KG/M2 | HEIGHT: 66 IN | DIASTOLIC BLOOD PRESSURE: 68 MMHG

## 2019-10-18 PROCEDURE — 0502F SUBSEQUENT PRENATAL CARE: CPT

## 2019-10-19 LAB
BASOPHILS # BLD AUTO: 0.03 K/UL
BASOPHILS NFR BLD AUTO: 0.4 %
EOSINOPHIL # BLD AUTO: 0.06 K/UL
EOSINOPHIL NFR BLD AUTO: 0.7 %
GLUCOSE 1H P 50 G GLC PO SERPL-MCNC: 90 MG/DL
HCT VFR BLD CALC: 29.7 %
HGB BLD-MCNC: 9.7 G/DL
HIV1+2 AB SPEC QL IA.RAPID: NONREACTIVE
IMM GRANULOCYTES NFR BLD AUTO: 0.4 %
LYMPHOCYTES # BLD AUTO: 1.77 K/UL
LYMPHOCYTES NFR BLD AUTO: 21.4 %
MAN DIFF?: NORMAL
MCHC RBC-ENTMCNC: 32.2 PG
MCHC RBC-ENTMCNC: 32.7 GM/DL
MCV RBC AUTO: 98.7 FL
MONOCYTES # BLD AUTO: 0.56 K/UL
MONOCYTES NFR BLD AUTO: 6.8 %
NEUTROPHILS # BLD AUTO: 5.83 K/UL
NEUTROPHILS NFR BLD AUTO: 70.3 %
PLATELET # BLD AUTO: 180 K/UL
RBC # BLD: 3.01 M/UL
RBC # FLD: 13.2 %
WBC # FLD AUTO: 8.28 K/UL

## 2019-10-30 ENCOUNTER — APPOINTMENT (OUTPATIENT)
Dept: ANTEPARTUM | Facility: CLINIC | Age: 29
End: 2019-10-30
Payer: COMMERCIAL

## 2019-10-30 ENCOUNTER — ASOB RESULT (OUTPATIENT)
Age: 29
End: 2019-10-30

## 2019-10-30 PROCEDURE — 76816 OB US FOLLOW-UP PER FETUS: CPT

## 2019-11-01 ENCOUNTER — NON-APPOINTMENT (OUTPATIENT)
Age: 29
End: 2019-11-01

## 2019-11-01 ENCOUNTER — APPOINTMENT (OUTPATIENT)
Dept: OBGYN | Facility: CLINIC | Age: 29
End: 2019-11-01
Payer: COMMERCIAL

## 2019-11-01 VITALS
HEIGHT: 66 IN | BODY MASS INDEX: 23.78 KG/M2 | DIASTOLIC BLOOD PRESSURE: 70 MMHG | WEIGHT: 148 LBS | SYSTOLIC BLOOD PRESSURE: 127 MMHG

## 2019-11-01 PROCEDURE — 0502F SUBSEQUENT PRENATAL CARE: CPT

## 2019-11-07 ENCOUNTER — ASOB RESULT (OUTPATIENT)
Age: 29
End: 2019-11-07

## 2019-11-07 ENCOUNTER — APPOINTMENT (OUTPATIENT)
Dept: ANTEPARTUM | Facility: CLINIC | Age: 29
End: 2019-11-07
Payer: COMMERCIAL

## 2019-11-07 PROCEDURE — 76820 UMBILICAL ARTERY ECHO: CPT

## 2019-11-07 PROCEDURE — 76819 FETAL BIOPHYS PROFIL W/O NST: CPT

## 2019-11-12 ENCOUNTER — TRANSCRIPTION ENCOUNTER (OUTPATIENT)
Age: 29
End: 2019-11-12

## 2019-11-13 ENCOUNTER — APPOINTMENT (OUTPATIENT)
Dept: ANTEPARTUM | Facility: CLINIC | Age: 29
End: 2019-11-13
Payer: COMMERCIAL

## 2019-11-13 ENCOUNTER — ASOB RESULT (OUTPATIENT)
Age: 29
End: 2019-11-13

## 2019-11-13 PROCEDURE — 76816 OB US FOLLOW-UP PER FETUS: CPT

## 2019-11-13 PROCEDURE — 76819 FETAL BIOPHYS PROFIL W/O NST: CPT

## 2019-11-13 PROCEDURE — 76820 UMBILICAL ARTERY ECHO: CPT

## 2019-11-15 ENCOUNTER — APPOINTMENT (OUTPATIENT)
Dept: OBGYN | Facility: CLINIC | Age: 29
End: 2019-11-15
Payer: COMMERCIAL

## 2019-11-15 ENCOUNTER — NON-APPOINTMENT (OUTPATIENT)
Age: 29
End: 2019-11-15

## 2019-11-15 VITALS
BODY MASS INDEX: 24.91 KG/M2 | WEIGHT: 155 LBS | DIASTOLIC BLOOD PRESSURE: 60 MMHG | SYSTOLIC BLOOD PRESSURE: 120 MMHG | HEIGHT: 66 IN

## 2019-11-15 PROCEDURE — 90471 IMMUNIZATION ADMIN: CPT

## 2019-11-15 PROCEDURE — 0502F SUBSEQUENT PRENATAL CARE: CPT

## 2019-11-15 PROCEDURE — 90715 TDAP VACCINE 7 YRS/> IM: CPT

## 2019-11-21 ENCOUNTER — ASOB RESULT (OUTPATIENT)
Age: 29
End: 2019-11-21

## 2019-11-21 ENCOUNTER — APPOINTMENT (OUTPATIENT)
Dept: ANTEPARTUM | Facility: CLINIC | Age: 29
End: 2019-11-21
Payer: COMMERCIAL

## 2019-11-21 PROCEDURE — 76820 UMBILICAL ARTERY ECHO: CPT

## 2019-11-21 PROCEDURE — 76819 FETAL BIOPHYS PROFIL W/O NST: CPT

## 2019-11-29 ENCOUNTER — ASOB RESULT (OUTPATIENT)
Age: 29
End: 2019-11-29

## 2019-11-29 ENCOUNTER — APPOINTMENT (OUTPATIENT)
Dept: ANTEPARTUM | Facility: CLINIC | Age: 29
End: 2019-11-29
Payer: COMMERCIAL

## 2019-11-29 ENCOUNTER — APPOINTMENT (OUTPATIENT)
Dept: ANTEPARTUM | Facility: CLINIC | Age: 29
End: 2019-11-29

## 2019-11-29 ENCOUNTER — APPOINTMENT (OUTPATIENT)
Dept: OBGYN | Facility: CLINIC | Age: 29
End: 2019-11-29
Payer: COMMERCIAL

## 2019-11-29 VITALS
DIASTOLIC BLOOD PRESSURE: 84 MMHG | BODY MASS INDEX: 25.55 KG/M2 | WEIGHT: 159 LBS | SYSTOLIC BLOOD PRESSURE: 127 MMHG | HEIGHT: 66 IN

## 2019-11-29 DIAGNOSIS — O36.5990 MATERNAL CARE FOR OTHER KNOWN OR SUSPECTED POOR FETAL GROWTH, UNSPECIFIED TRIMESTER, NOT APPLICABLE OR UNSPECIFIED: ICD-10-CM

## 2019-11-29 DIAGNOSIS — Z34.93 ENCOUNTER FOR SUPERVISION OF NORMAL PREGNANCY, UNSPECIFIED, THIRD TRIMESTER: ICD-10-CM

## 2019-11-29 PROCEDURE — 76819 FETAL BIOPHYS PROFIL W/O NST: CPT

## 2019-11-29 PROCEDURE — 0502F SUBSEQUENT PRENATAL CARE: CPT

## 2019-11-29 PROCEDURE — 76816 OB US FOLLOW-UP PER FETUS: CPT

## 2019-12-05 ENCOUNTER — ASOB RESULT (OUTPATIENT)
Age: 29
End: 2019-12-05

## 2019-12-05 ENCOUNTER — APPOINTMENT (OUTPATIENT)
Dept: ANTEPARTUM | Facility: CLINIC | Age: 29
End: 2019-12-05
Payer: COMMERCIAL

## 2019-12-05 PROCEDURE — 76819 FETAL BIOPHYS PROFIL W/O NST: CPT

## 2019-12-05 PROCEDURE — 76820 UMBILICAL ARTERY ECHO: CPT

## 2019-12-09 ENCOUNTER — NON-APPOINTMENT (OUTPATIENT)
Age: 29
End: 2019-12-09

## 2019-12-09 ENCOUNTER — INPATIENT (INPATIENT)
Facility: HOSPITAL | Age: 29
LOS: 4 days | Discharge: HOME | End: 2019-12-14
Attending: OBSTETRICS & GYNECOLOGY | Admitting: OBSTETRICS & GYNECOLOGY
Payer: COMMERCIAL

## 2019-12-09 DIAGNOSIS — Z3A.00 WEEKS OF GESTATION OF PREGNANCY NOT SPECIFIED: ICD-10-CM

## 2019-12-09 DIAGNOSIS — O26.899 OTHER SPECIFIED PREGNANCY RELATED CONDITIONS, UNSPECIFIED TRIMESTER: ICD-10-CM

## 2019-12-10 VITALS
WEIGHT: 165.35 LBS | DIASTOLIC BLOOD PRESSURE: 89 MMHG | SYSTOLIC BLOOD PRESSURE: 148 MMHG | HEART RATE: 91 BPM | HEIGHT: 65 IN

## 2019-12-10 DIAGNOSIS — Z34.80 ENCOUNTER FOR SUPERVISION OF OTHER NORMAL PREGNANCY, UNSPECIFIED TRIMESTER: ICD-10-CM

## 2019-12-10 LAB
ALBUMIN SERPL ELPH-MCNC: 2.7 G/DL — LOW (ref 3.3–5)
ALBUMIN SERPL ELPH-MCNC: 2.7 G/DL — LOW (ref 3.3–5)
ALBUMIN SERPL ELPH-MCNC: 2.9 G/DL — LOW (ref 3.3–5)
ALBUMIN SERPL ELPH-MCNC: 2.9 G/DL — LOW (ref 3.3–5)
ALBUMIN SERPL ELPH-MCNC: 3.3 G/DL — SIGNIFICANT CHANGE UP (ref 3.3–5)
ALP SERPL-CCNC: 168 U/L — HIGH (ref 40–120)
ALP SERPL-CCNC: 189 U/L — HIGH (ref 40–120)
ALP SERPL-CCNC: 190 U/L — HIGH (ref 40–120)
ALP SERPL-CCNC: 194 U/L — HIGH (ref 40–120)
ALP SERPL-CCNC: 233 U/L — HIGH (ref 40–120)
ALT FLD-CCNC: 10 U/L — SIGNIFICANT CHANGE UP (ref 10–45)
ALT FLD-CCNC: 11 U/L — SIGNIFICANT CHANGE UP (ref 10–45)
ALT FLD-CCNC: 13 U/L — SIGNIFICANT CHANGE UP (ref 10–45)
ALT FLD-CCNC: 15 U/L — SIGNIFICANT CHANGE UP (ref 10–45)
ALT FLD-CCNC: 15 U/L — SIGNIFICANT CHANGE UP (ref 10–45)
ANION GAP SERPL CALC-SCNC: 10 MMOL/L — SIGNIFICANT CHANGE UP (ref 5–17)
ANION GAP SERPL CALC-SCNC: 11 MMOL/L — SIGNIFICANT CHANGE UP (ref 5–17)
ANION GAP SERPL CALC-SCNC: 13 MMOL/L — SIGNIFICANT CHANGE UP (ref 5–17)
ANION GAP SERPL CALC-SCNC: 14 MMOL/L — SIGNIFICANT CHANGE UP (ref 5–17)
ANION GAP SERPL CALC-SCNC: 15 MMOL/L — SIGNIFICANT CHANGE UP (ref 5–17)
APPEARANCE UR: ABNORMAL
APTT BLD: 26.2 SEC — LOW (ref 27.5–36.3)
APTT BLD: 28 SEC — SIGNIFICANT CHANGE UP (ref 27.5–36.3)
APTT BLD: 29.1 SEC — SIGNIFICANT CHANGE UP (ref 27.5–36.3)
APTT BLD: 29.3 SEC — SIGNIFICANT CHANGE UP (ref 27.5–36.3)
APTT BLD: 29.6 SEC — SIGNIFICANT CHANGE UP (ref 27.5–36.3)
AST SERPL-CCNC: 27 U/L — SIGNIFICANT CHANGE UP (ref 10–40)
AST SERPL-CCNC: 29 U/L — SIGNIFICANT CHANGE UP (ref 10–40)
AST SERPL-CCNC: 31 U/L — SIGNIFICANT CHANGE UP (ref 10–40)
AST SERPL-CCNC: 32 U/L — SIGNIFICANT CHANGE UP (ref 10–40)
AST SERPL-CCNC: 33 U/L — SIGNIFICANT CHANGE UP (ref 10–40)
BACTERIA # UR AUTO: NEGATIVE — SIGNIFICANT CHANGE UP
BASOPHILS # BLD AUTO: 0.02 K/UL — SIGNIFICANT CHANGE UP (ref 0–0.2)
BASOPHILS # BLD AUTO: 0.03 K/UL — SIGNIFICANT CHANGE UP (ref 0–0.2)
BASOPHILS # BLD AUTO: 0.03 K/UL — SIGNIFICANT CHANGE UP (ref 0–0.2)
BASOPHILS # BLD AUTO: 0.04 K/UL — SIGNIFICANT CHANGE UP (ref 0–0.2)
BASOPHILS # BLD AUTO: 0.04 K/UL — SIGNIFICANT CHANGE UP (ref 0–0.2)
BASOPHILS NFR BLD AUTO: 0.1 % — SIGNIFICANT CHANGE UP (ref 0–2)
BASOPHILS NFR BLD AUTO: 0.1 % — SIGNIFICANT CHANGE UP (ref 0–2)
BASOPHILS NFR BLD AUTO: 0.2 % — SIGNIFICANT CHANGE UP (ref 0–2)
BASOPHILS NFR BLD AUTO: 0.2 % — SIGNIFICANT CHANGE UP (ref 0–2)
BASOPHILS NFR BLD AUTO: 0.3 % — SIGNIFICANT CHANGE UP (ref 0–2)
BILIRUB SERPL-MCNC: 0.1 MG/DL — LOW (ref 0.2–1.2)
BILIRUB SERPL-MCNC: 0.1 MG/DL — LOW (ref 0.2–1.2)
BILIRUB SERPL-MCNC: <0.1 MG/DL — LOW (ref 0.2–1.2)
BILIRUB UR-MCNC: NEGATIVE — SIGNIFICANT CHANGE UP
BLD GP AB SCN SERPL QL: NEGATIVE — SIGNIFICANT CHANGE UP
BUN SERPL-MCNC: 12 MG/DL — SIGNIFICANT CHANGE UP (ref 7–23)
BUN SERPL-MCNC: 13 MG/DL — SIGNIFICANT CHANGE UP (ref 7–23)
BUN SERPL-MCNC: 14 MG/DL — SIGNIFICANT CHANGE UP (ref 7–23)
BUN SERPL-MCNC: 8 MG/DL — SIGNIFICANT CHANGE UP (ref 7–23)
BUN SERPL-MCNC: 9 MG/DL — SIGNIFICANT CHANGE UP (ref 7–23)
CALCIUM SERPL-MCNC: 6.4 MG/DL — CRITICAL LOW (ref 8.4–10.5)
CALCIUM SERPL-MCNC: 7.4 MG/DL — LOW (ref 8.4–10.5)
CALCIUM SERPL-MCNC: 8.1 MG/DL — LOW (ref 8.4–10.5)
CALCIUM SERPL-MCNC: 8.6 MG/DL — SIGNIFICANT CHANGE UP (ref 8.4–10.5)
CALCIUM SERPL-MCNC: 9.3 MG/DL — SIGNIFICANT CHANGE UP (ref 8.4–10.5)
CHLORIDE SERPL-SCNC: 104 MMOL/L — SIGNIFICANT CHANGE UP (ref 96–108)
CHLORIDE SERPL-SCNC: 104 MMOL/L — SIGNIFICANT CHANGE UP (ref 96–108)
CHLORIDE SERPL-SCNC: 108 MMOL/L — SIGNIFICANT CHANGE UP (ref 96–108)
CHLORIDE SERPL-SCNC: 88 MMOL/L — LOW (ref 96–108)
CHLORIDE SERPL-SCNC: 96 MMOL/L — SIGNIFICANT CHANGE UP (ref 96–108)
CO2 SERPL-SCNC: 20 MMOL/L — LOW (ref 22–31)
CO2 SERPL-SCNC: 21 MMOL/L — LOW (ref 22–31)
CO2 SERPL-SCNC: 24 MMOL/L — SIGNIFICANT CHANGE UP (ref 22–31)
COLOR SPEC: SIGNIFICANT CHANGE UP
CREAT ?TM UR-MCNC: 29 MG/DL — SIGNIFICANT CHANGE UP
CREAT SERPL-MCNC: 0.5 MG/DL — SIGNIFICANT CHANGE UP (ref 0.5–1.3)
CREAT SERPL-MCNC: 0.55 MG/DL — SIGNIFICANT CHANGE UP (ref 0.5–1.3)
CREAT SERPL-MCNC: 0.61 MG/DL — SIGNIFICANT CHANGE UP (ref 0.5–1.3)
CREAT SERPL-MCNC: 0.63 MG/DL — SIGNIFICANT CHANGE UP (ref 0.5–1.3)
CREAT SERPL-MCNC: 0.68 MG/DL — SIGNIFICANT CHANGE UP (ref 0.5–1.3)
DIFF PNL FLD: ABNORMAL
EOSINOPHIL # BLD AUTO: 0 K/UL — SIGNIFICANT CHANGE UP (ref 0–0.5)
EOSINOPHIL # BLD AUTO: 0.03 K/UL — SIGNIFICANT CHANGE UP (ref 0–0.5)
EOSINOPHIL # BLD AUTO: 0.07 K/UL — SIGNIFICANT CHANGE UP (ref 0–0.5)
EOSINOPHIL NFR BLD AUTO: 0 % — SIGNIFICANT CHANGE UP (ref 0–6)
EOSINOPHIL NFR BLD AUTO: 0.2 % — SIGNIFICANT CHANGE UP (ref 0–6)
EOSINOPHIL NFR BLD AUTO: 0.5 % — SIGNIFICANT CHANGE UP (ref 0–6)
EPI CELLS # UR: 0 /HPF — SIGNIFICANT CHANGE UP
FIBRINOGEN PPP-MCNC: 328 MG/DL — LOW (ref 350–510)
FIBRINOGEN PPP-MCNC: 343 MG/DL — LOW (ref 350–510)
FIBRINOGEN PPP-MCNC: 344 MG/DL — LOW (ref 350–510)
FIBRINOGEN PPP-MCNC: 369 MG/DL — SIGNIFICANT CHANGE UP (ref 350–510)
FIBRINOGEN PPP-MCNC: 408 MG/DL — SIGNIFICANT CHANGE UP (ref 350–510)
GLUCOSE SERPL-MCNC: 111 MG/DL — HIGH (ref 70–99)
GLUCOSE SERPL-MCNC: 128 MG/DL — HIGH (ref 70–99)
GLUCOSE SERPL-MCNC: 132 MG/DL — HIGH (ref 70–99)
GLUCOSE SERPL-MCNC: 87 MG/DL — SIGNIFICANT CHANGE UP (ref 70–99)
GLUCOSE SERPL-MCNC: 90 MG/DL — SIGNIFICANT CHANGE UP (ref 70–99)
GLUCOSE UR QL: NEGATIVE — SIGNIFICANT CHANGE UP
HCT VFR BLD CALC: 27.8 % — LOW (ref 34.5–45)
HCT VFR BLD CALC: 30.5 % — LOW (ref 34.5–45)
HCT VFR BLD CALC: 31.8 % — LOW (ref 34.5–45)
HCT VFR BLD CALC: 32.8 % — LOW (ref 34.5–45)
HCT VFR BLD CALC: 35.3 % — SIGNIFICANT CHANGE UP (ref 34.5–45)
HGB BLD-MCNC: 10.2 G/DL — LOW (ref 11.5–15.5)
HGB BLD-MCNC: 10.6 G/DL — LOW (ref 11.5–15.5)
HGB BLD-MCNC: 10.8 G/DL — LOW (ref 11.5–15.5)
HGB BLD-MCNC: 11.6 G/DL — SIGNIFICANT CHANGE UP (ref 11.5–15.5)
HGB BLD-MCNC: 9.4 G/DL — LOW (ref 11.5–15.5)
HYALINE CASTS # UR AUTO: 0 /LPF — SIGNIFICANT CHANGE UP (ref 0–2)
IMM GRANULOCYTES NFR BLD AUTO: 0.9 % — SIGNIFICANT CHANGE UP (ref 0–1.5)
IMM GRANULOCYTES NFR BLD AUTO: 1.1 % — SIGNIFICANT CHANGE UP (ref 0–1.5)
IMM GRANULOCYTES NFR BLD AUTO: 1.2 % — SIGNIFICANT CHANGE UP (ref 0–1.5)
IMM GRANULOCYTES NFR BLD AUTO: 1.5 % — SIGNIFICANT CHANGE UP (ref 0–1.5)
IMM GRANULOCYTES NFR BLD AUTO: 1.8 % — HIGH (ref 0–1.5)
INR BLD: 0.85 RATIO — LOW (ref 0.88–1.16)
INR BLD: 0.86 RATIO — LOW (ref 0.88–1.16)
INR BLD: 0.88 RATIO — SIGNIFICANT CHANGE UP (ref 0.88–1.16)
KETONES UR-MCNC: NEGATIVE — SIGNIFICANT CHANGE UP
KLEIHAUER-BETKE CALCULATION: 0 % — SIGNIFICANT CHANGE UP (ref 0–0.3)
LDH SERPL L TO P-CCNC: 269 U/L — HIGH (ref 50–242)
LDH SERPL L TO P-CCNC: 286 U/L — HIGH (ref 50–242)
LDH SERPL L TO P-CCNC: 299 U/L — HIGH (ref 50–242)
LDH SERPL L TO P-CCNC: 343 U/L — HIGH (ref 50–242)
LDH SERPL L TO P-CCNC: 351 U/L — HIGH (ref 50–242)
LEUKOCYTE ESTERASE UR-ACNC: NEGATIVE — SIGNIFICANT CHANGE UP
LYMPHOCYTES # BLD AUTO: 1.36 K/UL — SIGNIFICANT CHANGE UP (ref 1–3.3)
LYMPHOCYTES # BLD AUTO: 1.91 K/UL — SIGNIFICANT CHANGE UP (ref 1–3.3)
LYMPHOCYTES # BLD AUTO: 1.97 K/UL — SIGNIFICANT CHANGE UP (ref 1–3.3)
LYMPHOCYTES # BLD AUTO: 10 % — LOW (ref 13–44)
LYMPHOCYTES # BLD AUTO: 13.9 % — SIGNIFICANT CHANGE UP (ref 13–44)
LYMPHOCYTES # BLD AUTO: 2.21 K/UL — SIGNIFICANT CHANGE UP (ref 1–3.3)
LYMPHOCYTES # BLD AUTO: 26.3 % — SIGNIFICANT CHANGE UP (ref 13–44)
LYMPHOCYTES # BLD AUTO: 3.38 K/UL — HIGH (ref 1–3.3)
LYMPHOCYTES # BLD AUTO: 7.2 % — LOW (ref 13–44)
LYMPHOCYTES # BLD AUTO: 8.5 % — LOW (ref 13–44)
MAGNESIUM SERPL-MCNC: 4.8 MG/DL — HIGH (ref 1.6–2.6)
MAGNESIUM SERPL-MCNC: 5.9 MG/DL — HIGH (ref 1.6–2.6)
MAGNESIUM SERPL-MCNC: 6.2 MG/DL — HIGH (ref 1.6–2.6)
MCHC RBC-ENTMCNC: 32.6 PG — SIGNIFICANT CHANGE UP (ref 27–34)
MCHC RBC-ENTMCNC: 32.9 GM/DL — SIGNIFICANT CHANGE UP (ref 32–36)
MCHC RBC-ENTMCNC: 32.9 GM/DL — SIGNIFICANT CHANGE UP (ref 32–36)
MCHC RBC-ENTMCNC: 33 PG — SIGNIFICANT CHANGE UP (ref 27–34)
MCHC RBC-ENTMCNC: 33.1 PG — SIGNIFICANT CHANGE UP (ref 27–34)
MCHC RBC-ENTMCNC: 33.3 GM/DL — SIGNIFICANT CHANGE UP (ref 32–36)
MCHC RBC-ENTMCNC: 33.3 PG — SIGNIFICANT CHANGE UP (ref 27–34)
MCHC RBC-ENTMCNC: 33.4 GM/DL — SIGNIFICANT CHANGE UP (ref 32–36)
MCHC RBC-ENTMCNC: 33.8 GM/DL — SIGNIFICANT CHANGE UP (ref 32–36)
MCHC RBC-ENTMCNC: 33.8 PG — SIGNIFICANT CHANGE UP (ref 27–34)
MCV RBC AUTO: 100 FL — SIGNIFICANT CHANGE UP (ref 80–100)
MCV RBC AUTO: 101.2 FL — HIGH (ref 80–100)
MCV RBC AUTO: 99 FL — SIGNIFICANT CHANGE UP (ref 80–100)
MCV RBC AUTO: 99.1 FL — SIGNIFICANT CHANGE UP (ref 80–100)
MCV RBC AUTO: 99.2 FL — SIGNIFICANT CHANGE UP (ref 80–100)
MONOCYTES # BLD AUTO: 0.42 K/UL — SIGNIFICANT CHANGE UP (ref 0–0.9)
MONOCYTES # BLD AUTO: 0.45 K/UL — SIGNIFICANT CHANGE UP (ref 0–0.9)
MONOCYTES # BLD AUTO: 0.79 K/UL — SIGNIFICANT CHANGE UP (ref 0–0.9)
MONOCYTES # BLD AUTO: 1.09 K/UL — HIGH (ref 0–0.9)
MONOCYTES # BLD AUTO: 1.19 K/UL — HIGH (ref 0–0.9)
MONOCYTES NFR BLD AUTO: 2.4 % — SIGNIFICANT CHANGE UP (ref 2–14)
MONOCYTES NFR BLD AUTO: 3 % — SIGNIFICANT CHANGE UP (ref 2–14)
MONOCYTES NFR BLD AUTO: 4.9 % — SIGNIFICANT CHANGE UP (ref 2–14)
MONOCYTES NFR BLD AUTO: 5.3 % — SIGNIFICANT CHANGE UP (ref 2–14)
MONOCYTES NFR BLD AUTO: 6.2 % — SIGNIFICANT CHANGE UP (ref 2–14)
NEUTROPHILS # BLD AUTO: 11.48 K/UL — HIGH (ref 1.8–7.4)
NEUTROPHILS # BLD AUTO: 16.76 K/UL — HIGH (ref 1.8–7.4)
NEUTROPHILS # BLD AUTO: 18.52 K/UL — HIGH (ref 1.8–7.4)
NEUTROPHILS # BLD AUTO: 19.1 K/UL — HIGH (ref 1.8–7.4)
NEUTROPHILS # BLD AUTO: 8.44 K/UL — HIGH (ref 1.8–7.4)
NEUTROPHILS NFR BLD AUTO: 65.9 % — SIGNIFICANT CHANGE UP (ref 43–77)
NEUTROPHILS NFR BLD AUTO: 80.8 % — HIGH (ref 43–77)
NEUTROPHILS NFR BLD AUTO: 83.8 % — HIGH (ref 43–77)
NEUTROPHILS NFR BLD AUTO: 84.9 % — HIGH (ref 43–77)
NEUTROPHILS NFR BLD AUTO: 88.8 % — HIGH (ref 43–77)
NITRITE UR-MCNC: NEGATIVE — SIGNIFICANT CHANGE UP
NRBC # BLD: 0 /100 WBCS — SIGNIFICANT CHANGE UP (ref 0–0)
PH UR: 6.5 — SIGNIFICANT CHANGE UP (ref 5–8)
PLATELET # BLD AUTO: 123 K/UL — LOW (ref 150–400)
PLATELET # BLD AUTO: 125 K/UL — LOW (ref 150–400)
PLATELET # BLD AUTO: 131 K/UL — LOW (ref 150–400)
PLATELET # BLD AUTO: 139 K/UL — LOW (ref 150–400)
PLATELET # BLD AUTO: 144 K/UL — LOW (ref 150–400)
POTASSIUM SERPL-MCNC: 4.4 MMOL/L — SIGNIFICANT CHANGE UP (ref 3.5–5.3)
POTASSIUM SERPL-MCNC: 4.4 MMOL/L — SIGNIFICANT CHANGE UP (ref 3.5–5.3)
POTASSIUM SERPL-MCNC: 4.5 MMOL/L — SIGNIFICANT CHANGE UP (ref 3.5–5.3)
POTASSIUM SERPL-MCNC: 4.5 MMOL/L — SIGNIFICANT CHANGE UP (ref 3.5–5.3)
POTASSIUM SERPL-MCNC: 4.6 MMOL/L — SIGNIFICANT CHANGE UP (ref 3.5–5.3)
POTASSIUM SERPL-SCNC: 4.4 MMOL/L — SIGNIFICANT CHANGE UP (ref 3.5–5.3)
POTASSIUM SERPL-SCNC: 4.4 MMOL/L — SIGNIFICANT CHANGE UP (ref 3.5–5.3)
POTASSIUM SERPL-SCNC: 4.5 MMOL/L — SIGNIFICANT CHANGE UP (ref 3.5–5.3)
POTASSIUM SERPL-SCNC: 4.5 MMOL/L — SIGNIFICANT CHANGE UP (ref 3.5–5.3)
POTASSIUM SERPL-SCNC: 4.6 MMOL/L — SIGNIFICANT CHANGE UP (ref 3.5–5.3)
PROT ?TM UR-MCNC: 92 MG/DL — HIGH (ref 0–12)
PROT SERPL-MCNC: 5.1 G/DL — LOW (ref 6–8.3)
PROT SERPL-MCNC: 5.2 G/DL — LOW (ref 6–8.3)
PROT SERPL-MCNC: 5.4 G/DL — LOW (ref 6–8.3)
PROT SERPL-MCNC: 5.8 G/DL — LOW (ref 6–8.3)
PROT SERPL-MCNC: 6.4 G/DL — SIGNIFICANT CHANGE UP (ref 6–8.3)
PROT UR-MCNC: 100 — SIGNIFICANT CHANGE UP
PROT/CREAT UR-RTO: 3.2 RATIO — HIGH (ref 0–0.2)
PROTHROM AB SERPL-ACNC: 10 SEC — SIGNIFICANT CHANGE UP (ref 10–12.9)
PROTHROM AB SERPL-ACNC: 9.6 SEC — LOW (ref 10–12.9)
PROTHROM AB SERPL-ACNC: 9.8 SEC — LOW (ref 10–12.9)
PROTHROM AB SERPL-ACNC: 9.8 SEC — LOW (ref 10–12.9)
PROTHROM AB SERPL-ACNC: 9.9 SEC — LOW (ref 10–12.9)
RBC # BLD: 2.78 M/UL — LOW (ref 3.8–5.2)
RBC # BLD: 3.08 M/UL — LOW (ref 3.8–5.2)
RBC # BLD: 3.21 M/UL — LOW (ref 3.8–5.2)
RBC # BLD: 3.24 M/UL — LOW (ref 3.8–5.2)
RBC # BLD: 3.56 M/UL — LOW (ref 3.8–5.2)
RBC # FLD: 14.3 % — SIGNIFICANT CHANGE UP (ref 10.3–14.5)
RBC # FLD: 14.4 % — SIGNIFICANT CHANGE UP (ref 10.3–14.5)
RBC # FLD: 14.5 % — SIGNIFICANT CHANGE UP (ref 10.3–14.5)
RBC CASTS # UR COMP ASSIST: 305 /HPF — HIGH (ref 0–4)
RH IG SCN BLD-IMP: POSITIVE — SIGNIFICANT CHANGE UP
RH IG SCN BLD-IMP: POSITIVE — SIGNIFICANT CHANGE UP
SODIUM SERPL-SCNC: 122 MMOL/L — LOW (ref 135–145)
SODIUM SERPL-SCNC: 131 MMOL/L — LOW (ref 135–145)
SODIUM SERPL-SCNC: 138 MMOL/L — SIGNIFICANT CHANGE UP (ref 135–145)
SODIUM SERPL-SCNC: 139 MMOL/L — SIGNIFICANT CHANGE UP (ref 135–145)
SODIUM SERPL-SCNC: 141 MMOL/L — SIGNIFICANT CHANGE UP (ref 135–145)
SP GR SPEC: 1.01 — LOW (ref 1.01–1.02)
T PALLIDUM AB TITR SER: NEGATIVE — SIGNIFICANT CHANGE UP
URATE SERPL-MCNC: 5.3 MG/DL — SIGNIFICANT CHANGE UP (ref 2.5–7)
URATE SERPL-MCNC: 5.5 MG/DL — SIGNIFICANT CHANGE UP (ref 2.5–7)
URATE SERPL-MCNC: 5.8 MG/DL — SIGNIFICANT CHANGE UP (ref 2.5–7)
UROBILINOGEN FLD QL: NEGATIVE — SIGNIFICANT CHANGE UP
WBC # BLD: 12.83 K/UL — HIGH (ref 3.8–10.5)
WBC # BLD: 14.19 K/UL — HIGH (ref 3.8–10.5)
WBC # BLD: 18.87 K/UL — HIGH (ref 3.8–10.5)
WBC # BLD: 22.11 K/UL — HIGH (ref 3.8–10.5)
WBC # BLD: 22.5 K/UL — HIGH (ref 3.8–10.5)
WBC # FLD AUTO: 12.83 K/UL — HIGH (ref 3.8–10.5)
WBC # FLD AUTO: 14.19 K/UL — HIGH (ref 3.8–10.5)
WBC # FLD AUTO: 18.87 K/UL — HIGH (ref 3.8–10.5)
WBC # FLD AUTO: 22.11 K/UL — HIGH (ref 3.8–10.5)
WBC # FLD AUTO: 22.5 K/UL — HIGH (ref 3.8–10.5)
WBC UR QL: 1 /HPF — SIGNIFICANT CHANGE UP (ref 0–5)

## 2019-12-10 PROCEDURE — 88307 TISSUE EXAM BY PATHOLOGIST: CPT | Mod: 26

## 2019-12-10 PROCEDURE — 59510 CESAREAN DELIVERY: CPT

## 2019-12-10 RX ORDER — FAMOTIDINE 10 MG/ML
20 INJECTION INTRAVENOUS ONCE
Refills: 0 | Status: DISCONTINUED | OUTPATIENT
Start: 2019-12-10 | End: 2019-12-10

## 2019-12-10 RX ORDER — LANOLIN
1 OINTMENT (GRAM) TOPICAL EVERY 6 HOURS
Refills: 0 | Status: DISCONTINUED | OUTPATIENT
Start: 2019-12-10 | End: 2019-12-14

## 2019-12-10 RX ORDER — ACETAMINOPHEN 500 MG
975 TABLET ORAL EVERY 6 HOURS
Refills: 0 | Status: COMPLETED | OUTPATIENT
Start: 2019-12-10 | End: 2020-11-07

## 2019-12-10 RX ORDER — DIPHENHYDRAMINE HCL 50 MG
25 CAPSULE ORAL EVERY 6 HOURS
Refills: 0 | Status: DISCONTINUED | OUTPATIENT
Start: 2019-12-10 | End: 2019-12-14

## 2019-12-10 RX ORDER — SODIUM CHLORIDE 9 MG/ML
1000 INJECTION, SOLUTION INTRAVENOUS
Refills: 0 | Status: DISCONTINUED | OUTPATIENT
Start: 2019-12-10 | End: 2019-12-10

## 2019-12-10 RX ORDER — HEPARIN SODIUM 5000 [USP'U]/ML
5000 INJECTION INTRAVENOUS; SUBCUTANEOUS EVERY 12 HOURS
Refills: 0 | Status: DISCONTINUED | OUTPATIENT
Start: 2019-12-10 | End: 2019-12-14

## 2019-12-10 RX ORDER — MAGNESIUM SULFATE 500 MG/ML
4 VIAL (ML) INJECTION ONCE
Refills: 0 | Status: COMPLETED | OUTPATIENT
Start: 2019-12-10 | End: 2019-12-10

## 2019-12-10 RX ORDER — GLYCERIN ADULT
1 SUPPOSITORY, RECTAL RECTAL AT BEDTIME
Refills: 0 | Status: DISCONTINUED | OUTPATIENT
Start: 2019-12-10 | End: 2019-12-14

## 2019-12-10 RX ORDER — OXYCODONE HYDROCHLORIDE 5 MG/1
5 TABLET ORAL ONCE
Refills: 0 | Status: DISCONTINUED | OUTPATIENT
Start: 2019-12-10 | End: 2019-12-14

## 2019-12-10 RX ORDER — HYDROMORPHONE HYDROCHLORIDE 2 MG/ML
0.5 INJECTION INTRAMUSCULAR; INTRAVENOUS; SUBCUTANEOUS
Refills: 0 | Status: DISCONTINUED | OUTPATIENT
Start: 2019-12-10 | End: 2019-12-11

## 2019-12-10 RX ORDER — ACETAMINOPHEN 500 MG
1000 TABLET ORAL EVERY 6 HOURS
Refills: 0 | Status: COMPLETED | OUTPATIENT
Start: 2019-12-10 | End: 2019-12-11

## 2019-12-10 RX ORDER — OXYCODONE HYDROCHLORIDE 5 MG/1
5 TABLET ORAL
Refills: 0 | Status: DISCONTINUED | OUTPATIENT
Start: 2019-12-10 | End: 2019-12-14

## 2019-12-10 RX ORDER — TETANUS TOXOID, REDUCED DIPHTHERIA TOXOID AND ACELLULAR PERTUSSIS VACCINE, ADSORBED 5; 2.5; 8; 8; 2.5 [IU]/.5ML; [IU]/.5ML; UG/.5ML; UG/.5ML; UG/.5ML
0.5 SUSPENSION INTRAMUSCULAR ONCE
Refills: 0 | Status: DISCONTINUED | OUTPATIENT
Start: 2019-12-10 | End: 2019-12-14

## 2019-12-10 RX ORDER — SIMETHICONE 80 MG/1
80 TABLET, CHEWABLE ORAL EVERY 4 HOURS
Refills: 0 | Status: DISCONTINUED | OUTPATIENT
Start: 2019-12-10 | End: 2019-12-14

## 2019-12-10 RX ORDER — LABETALOL HCL 100 MG
300 TABLET ORAL THREE TIMES A DAY
Refills: 0 | Status: DISCONTINUED | OUTPATIENT
Start: 2019-12-10 | End: 2019-12-14

## 2019-12-10 RX ORDER — ONDANSETRON 8 MG/1
4 TABLET, FILM COATED ORAL EVERY 6 HOURS
Refills: 0 | Status: DISCONTINUED | OUTPATIENT
Start: 2019-12-10 | End: 2019-12-11

## 2019-12-10 RX ORDER — CITRIC ACID/SODIUM CITRATE 300-500 MG
30 SOLUTION, ORAL ORAL ONCE
Refills: 0 | Status: DISCONTINUED | OUTPATIENT
Start: 2019-12-10 | End: 2019-12-10

## 2019-12-10 RX ORDER — MAGNESIUM HYDROXIDE 400 MG/1
30 TABLET, CHEWABLE ORAL
Refills: 0 | Status: DISCONTINUED | OUTPATIENT
Start: 2019-12-10 | End: 2019-12-14

## 2019-12-10 RX ORDER — SODIUM CHLORIDE 9 MG/ML
1000 INJECTION, SOLUTION INTRAVENOUS
Refills: 0 | Status: DISCONTINUED | OUTPATIENT
Start: 2019-12-10 | End: 2019-12-11

## 2019-12-10 RX ORDER — OXYTOCIN 10 UNIT/ML
333.33 VIAL (ML) INJECTION
Qty: 20 | Refills: 0 | Status: DISCONTINUED | OUTPATIENT
Start: 2019-12-10 | End: 2019-12-11

## 2019-12-10 RX ORDER — MAGNESIUM SULFATE 500 MG/ML
2 VIAL (ML) INJECTION
Qty: 40 | Refills: 0 | Status: DISCONTINUED | OUTPATIENT
Start: 2019-12-10 | End: 2019-12-14

## 2019-12-10 RX ORDER — FERROUS SULFATE 325(65) MG
325 TABLET ORAL DAILY
Refills: 0 | Status: DISCONTINUED | OUTPATIENT
Start: 2019-12-10 | End: 2019-12-14

## 2019-12-10 RX ORDER — NALOXONE HYDROCHLORIDE 4 MG/.1ML
0.1 SPRAY NASAL
Refills: 0 | Status: DISCONTINUED | OUTPATIENT
Start: 2019-12-10 | End: 2019-12-11

## 2019-12-10 RX ORDER — METOCLOPRAMIDE HCL 10 MG
10 TABLET ORAL ONCE
Refills: 0 | Status: DISCONTINUED | OUTPATIENT
Start: 2019-12-10 | End: 2019-12-10

## 2019-12-10 RX ORDER — MAGNESIUM SULFATE 500 MG/ML
2 VIAL (ML) INJECTION
Qty: 40 | Refills: 0 | Status: DISCONTINUED | OUTPATIENT
Start: 2019-12-10 | End: 2019-12-10

## 2019-12-10 RX ORDER — ASCORBIC ACID 60 MG
500 TABLET,CHEWABLE ORAL DAILY
Refills: 0 | Status: DISCONTINUED | OUTPATIENT
Start: 2019-12-10 | End: 2019-12-14

## 2019-12-10 RX ORDER — HYDROMORPHONE HYDROCHLORIDE 2 MG/ML
30 INJECTION INTRAMUSCULAR; INTRAVENOUS; SUBCUTANEOUS
Refills: 0 | Status: DISCONTINUED | OUTPATIENT
Start: 2019-12-10 | End: 2019-12-11

## 2019-12-10 RX ORDER — TRANEXAMIC ACID 100 MG/ML
1000 INJECTION, SOLUTION INTRAVENOUS ONCE
Refills: 0 | Status: DISCONTINUED | OUTPATIENT
Start: 2019-12-10 | End: 2019-12-10

## 2019-12-10 RX ADMIN — Medication 400 MILLIGRAM(S): at 05:06

## 2019-12-10 RX ADMIN — Medication 12 MILLIGRAM(S): at 00:36

## 2019-12-10 RX ADMIN — HEPARIN SODIUM 5000 UNIT(S): 5000 INJECTION INTRAVENOUS; SUBCUTANEOUS at 17:22

## 2019-12-10 RX ADMIN — Medication 300 MILLIGRAM(S): at 03:40

## 2019-12-10 RX ADMIN — Medication 400 MILLIGRAM(S): at 17:33

## 2019-12-10 RX ADMIN — HYDROMORPHONE HYDROCHLORIDE 30 MILLILITER(S): 2 INJECTION INTRAMUSCULAR; INTRAVENOUS; SUBCUTANEOUS at 12:53

## 2019-12-10 RX ADMIN — Medication 200 GRAM(S): at 03:30

## 2019-12-10 RX ADMIN — ONDANSETRON 4 MILLIGRAM(S): 8 TABLET, FILM COATED ORAL at 19:54

## 2019-12-10 RX ADMIN — HYDROMORPHONE HYDROCHLORIDE 30 MILLILITER(S): 2 INJECTION INTRAMUSCULAR; INTRAVENOUS; SUBCUTANEOUS at 07:13

## 2019-12-10 RX ADMIN — Medication 300 MILLIGRAM(S): at 20:00

## 2019-12-10 RX ADMIN — Medication 300 MILLIGRAM(S): at 12:05

## 2019-12-10 RX ADMIN — HYDROMORPHONE HYDROCHLORIDE 30 MILLILITER(S): 2 INJECTION INTRAMUSCULAR; INTRAVENOUS; SUBCUTANEOUS at 04:05

## 2019-12-10 RX ADMIN — Medication 50 GM/HR: at 03:56

## 2019-12-10 NOTE — PRE-ANESTHESIA EVALUATION ADULT - NSANTHPMHFT_GEN_ALL_CORE
, termination x 1, anemia on iron pills (no blood transfusion history) , termination x 1, anemia on iron pills (no blood transfusion history)  High BP's in hospital, suspicious of pre-eclampsia

## 2019-12-10 NOTE — DISCUSSION/SUMMARY
[FreeTextEntry1] : Patient S/P urgent  section for severe preeclampsia and placental abruption. Pfannenstiel skin incision, lower uterine segment transverse incision. +clots noted at the time of uterine incision, amniotic fluid bloody. Patient delivered a live female infant, handed to the awaiting pediatricians. APGARS 8 and 9. +Uterine atony noted, uterine became firm with pitocin and fundal massage. Hysterotomy closed in two layers. Excellent hemostasis. Adnexa WNL B/L 2 cm pedunculated fibroid on anterior abdominal wall, Counts correct, EBL 1000cc. Tranexamic acid 1 gram given prior to skin incision, Patient received labetalol introp for BP control. Patient went to RR in stable condition. Labetalol 300mg TID started postpartum and magnesium sulfate for seizure prophylaxsis started postpartum.

## 2019-12-10 NOTE — PATIENT PROFILE OB - VISION (WITH CORRECTIVE LENSES IF THE PATIENT USUALLY WEARS THEM):
Your Diagnosis is: Right nose bleed, hypertension    Return to the Emergency department for persistent nose bleed ,or for any other issues or concerns.    Your new prescriptions are: none    Procedures done today: epistaxis managment    Additional instructions: follow up with your doctor           Nosebleed (Adult)    Bleeding from the nose most commonly occurs due to injury or drying and cracking of the inner lining of the nose. Most nosebleeds are due to dry air or nose-picking. They can occur during a common cold or an allergy attack. They can also occur on a very hot day, or from dry air in the winter.  If the bleeding site is found, it may be cauterized (treated to cause a blood clot to form). This may be done with a chemical, heat, or electricity. If the bleeding continues after the site is cauterized, or if the site cannot be found, packing may be placed in your nose. This is to apply pressure and stop the bleeding. The packing may be made of gauze or sponge. A small balloon catheter is sometimes used. These must be removed by your doctor. Some types of packing dissolve on their own.  Home care  · If packing was put in your nose, unless told otherwise, do not pull on it or try to remove it yourself. You will be given an appointment to have it removed. You may also have been given antibiotics to prevent a sinus infection. If so, finish all of the medicine.  · Do not blow your nose for 12 hours after the bleeding stops. This will allow a strong blood clot to form. Do not pick your nose. This may restart bleeding.  · Avoid drinking alcohol and hot liquids for the next two days. Alcohol or hot liquids in your mouth can dilate blood vessels in your nose. This can cause bleeding to start again.  · Do not take ibuprofen, naproxen, or aspirin-containing medicines. These thin the blood and may promote nose bleeding. You may take acetaminophen for pain, unless another pain medicine was prescribed.  · If the bleeding  starts again, sit up and lean forward to prevent swallowing blood. Pinch your nose tightly on both sides, as depicted above, for 10 to 15 minutes.  Time yourself, and don’t release the pressure on your nose until 10 minutes is up. If bleeding is not controlled, continue to pinch your nose and call your health care provider or return to this facility.  · If you have a cold, allergies, or dry nasal membranes, lubricate the nasal passages. Apply a small amount of petroleum jelly inside the nose with a cotton swab twice a day (morning and night).  · Avoid overheating your home, which can dry the air and worsen your condition.  · A humidifier in the room where you sleep will add moisture to the air.  · Use a saline nasal spray to keep nasal passages moist.  · Do not pick your nose. Keep fingernails trimmed to decrease risk of bleeds.  Follow-up care  Follow up with your health care provider, or as advised. Nasal packing should be rechecked or removed within 2 to 3 days.  When to seek medical advice  Call your health care provider right away if any of these occur.  · Another nosebleed that you cannot control  · Dizziness, weakness or fainting  · You become tired or confused  · Fever of 100.4ºF (38ºC) or higher, or as directed by your health care provider  · Headache  · Sinus or facial pain  · Shortness of breath or trouble breathing  © 7354-5032 [employer]. [address] [place] [address]. All rights reserved. This information is not intended as a substitute for professional medical care. Always follow your healthcare professional's instructions.          Established High Blood Pressure    High blood pressure (hypertension) is a chronic disease. Often health care providers don’t know what causes it. But it can be caused by certain health conditions and medicines.  If you have high blood pressure, you may not have any symptoms. If you do have symptoms, they may include headache, dizziness, changes  in your vision, chest pain, and shortness of breath. But even without symptoms, high blood pressure that’s not treated raises your risk for heart attack and stroke. High blood pressure is a serious health risk and shouldn’t be ignored.  A blood pressure reading is made up of two numbers: a higher number over a lower number. The top number is the systolic pressure. The bottom number is the diastolic pressure. A normal blood pressure is less than 120 over less than 80.  High blood pressure is when either the top number is 140 or higher, or the bottom number is 90 or higher. This must be the result when taking your blood pressure a number of times. The blood pressures between normal and high are called prehypertension.  Home care  If you have high blood pressure, you should do what is listed below to lower your blood pressure. If you are taking medicines for high blood pressure, these methods may reduce or end your need for medicines in the future.  · Begin a weight-loss program if you are overweight.  · Cut back on how much salt you get in your diet. Here’s how to do this:  ¨ Don’t eat foods that have a lot of salt. These include olives, pickles, smoked meats, and salted potato chips.  ¨ Don’t add salt to your food at the table.  ¨ Use only small amounts of salt when cooking.  · Begin an exercise program. Talk with your health care provider about the type of exercise program that would be best for you. It doesn't have to be hard. Even brisk walking for 20 minutes 3 times a week is a good form of exercise.  · Don’t take medicines that have heart stimulants. This includes many cold and sinus decongestant pills and sprays, as well as diet pills. Check the warnings about hypertension on the label. Stimulants such as amphetamine or cocaine could be lethal for someone with high blood pressure. Never take these.  · Limit how much caffeine you get in your diet. Switch to caffeine-free products.  · Stop smoking. If you are a  long-time smoker, this can be hard. Enroll in a stop-smoking program to make it more likely that you will quit for good.  · Learn how to handle stress. This is an important part of any program to lower blood pressure. Learn about relaxation methods like meditation, yoga, or biofeedback.  · If your provider prescribed medicines, take them exactly as directed. Missing doses may cause your blood pressure get out of control.  · Consider buying an automatic blood pressure machine. You can get one of these at most pharmacies. Use this to watch your blood pressure at home. Give the results to your provider.  Follow-up care  You will need to make regular visits to your health care provider. This is to check your blood pressure and to make changes to your medicines. Make a follow-up appointment as directed.  When to seek medical advice  Call your health care provider right away if any of these occur:  · Chest pain or shortness of breath  · Severe headache  · Throbbing or rushing sound in the ears  · Nosebleed  · Sudden severe pain in your belly (abdomen)  · Extreme drowsiness, confusion, or fainting  · Dizziness or dizziness with a spinning sensation (vertigo)  · Weakness of an arm or leg or one side of the face  · You have problems speaking or seeing   © 2758-5819 [employer]. [address] [place] [address], [place] [address]. All rights reserved. This information is not intended as a substitute for professional medical care. Always follow your healthcare professional's instructions.         Normal vision: sees adequately in most situations; can see medication labels, newsprint

## 2019-12-10 NOTE — PATIENT PROFILE OB - BREASTFEEDING PROVIDES MATERNAL HEALTH BENEFITS, DECREASED PREMENOPAUSAL BREAST CANCER, OVARIAN CANCER AND TYPE II DIABETES MELLITUS
"Otorrhea -   Vasocidin drop 2-3x/day into ears until Monday  If drainage then persists start omnicef 1x/day oral x 10 days (but let us know if drainage not imrpved after 48-72 hours)   - then if needed will see ENT later next week for ear cleaning    Preventive Care at the 30 Month Visit  Growth Measurements & Percentiles                        Weight: 31 lbs 11 oz / 14.4 kg (actual weight)  71 %ile based on CDC (Boys, 2-20 Years) weight-for-age data based on Weight recorded on 4/11/2019.                         Length: 3' 2.858\" / 98.7 cm  98 %ile based on CDC (Boys, 2-20 Years) Stature-for-age data based on Stature recorded on 4/11/2019.         Weight for length: 19 %ile based on CDC (Boys, 2-20 Years) weight-for-recumbent length based on body measurements available as of 4/11/2019.     Your child s next Preventive Check-up will be at 3 years of age    Development  At this age, your child may:    Speak in short, complete sentences    Wash and dry hands    Engage in imaginary play    Walk up steps, alternating feet    Run well without falling    Copy straight lines and circles    Grasp a crayon with thumb and fingers    Catch a large ball    Diet    Avoid junk foods and unhealthy snacks and soft drinks.    Your child may be a picky eater, offer a range of healthy foods.  Your job is to provide the food, your child s job is to choose what and how much to eat.    Eat together as often as possible.    Do not let your child run around while eating.  Make him sit and eat.  This will help prevent choking.    Sleep    Your child may stop taking regular naps.  If your child does not nap, you may want to start a  quiet time.       In the hour before bed, avoid digital media and vigorous play.      Quiet evening activities will help your child recognize bedtime is coming.    Safety    Use an approved toddler car seat every time your child rides in the car.      Any child, 2 years or older, who has outgrown the rear-facing " weight or height limit for their car seat, should use a forward-facing car seat with a harness.    Every child needs to be in the back seat through age 12.    Adults should model car safety by always using seatbelts.    Keep all medicines, cleaning supplies and poisons out of your child s reach.    Put the poison control number on all phones:  1-124.470.3502.    Use sunscreen with a SPF > 15 every 2 hours.    Be sure your child wears a helmet when riding in a seat on an adult s bicycle or on a tricycle.    Always watch your child when playing outside near a street.    Always watch your child near water.  Never leave your child alone in the bathtub or near water.    Give your child safe toys.  Do not let him play with toys that have small or sharp parts.    Do not leave your child alone in the car, even if he is asleep.    What Your Toddler Needs    Follow daily routines for eating, sleeping and playing.    Participate in family activities such as: eating meals together, going for a walk, and reading to your child every day.    Provide opportunities for your toddler to play with other toddlers near your child s age.    Acknowledge your child s feelings, even if they are not what you want to see (e.g.  I see that you really want that toy ).      Offer limited choices between 2 options to help build your child s independence and reduce frustration.    Use praise for all efforts and interest in potty training.  Offer choices about trying the potty and read stories about potty training with your toddler.    Limit screen time (TV, computer, video games) to no more than 1 hour per day of high quality programming watched with a caregiver.    Dental Care    Brush your child s teeth two times each day with a soft-bristled toothbrush.    Use a small amount (the size of a grain of rice) of fluoride toothpaste two times daily.    Bring your child to a dentist regularly.     Discuss the need for fluoride supplements if you have  well water.   Statement Selected

## 2019-12-11 DIAGNOSIS — O14.10 SEVERE PRE-ECLAMPSIA, UNSPECIFIED TRIMESTER: ICD-10-CM

## 2019-12-11 DIAGNOSIS — E87.1 HYPO-OSMOLALITY AND HYPONATREMIA: ICD-10-CM

## 2019-12-11 LAB
ALBUMIN SERPL ELPH-MCNC: 2.6 G/DL — LOW (ref 3.3–5)
ALBUMIN SERPL ELPH-MCNC: 2.9 G/DL — LOW (ref 3.3–5)
ALP SERPL-CCNC: 158 U/L — HIGH (ref 40–120)
ALP SERPL-CCNC: 178 U/L — HIGH (ref 40–120)
ALT FLD-CCNC: 11 U/L — SIGNIFICANT CHANGE UP (ref 10–45)
ALT FLD-CCNC: 13 U/L — SIGNIFICANT CHANGE UP (ref 10–45)
ANION GAP SERPL CALC-SCNC: 12 MMOL/L — SIGNIFICANT CHANGE UP (ref 5–17)
ANION GAP SERPL CALC-SCNC: 12 MMOL/L — SIGNIFICANT CHANGE UP (ref 5–17)
APTT BLD: 26 SEC — LOW (ref 27.5–36.3)
AST SERPL-CCNC: 29 U/L — SIGNIFICANT CHANGE UP (ref 10–40)
AST SERPL-CCNC: 31 U/L — SIGNIFICANT CHANGE UP (ref 10–40)
BASOPHILS # BLD AUTO: 0.03 K/UL — SIGNIFICANT CHANGE UP (ref 0–0.2)
BASOPHILS NFR BLD AUTO: 0.2 % — SIGNIFICANT CHANGE UP (ref 0–2)
BILIRUB SERPL-MCNC: <0.1 MG/DL — LOW (ref 0.2–1.2)
BILIRUB SERPL-MCNC: <0.1 MG/DL — LOW (ref 0.2–1.2)
BUN SERPL-MCNC: 9 MG/DL — SIGNIFICANT CHANGE UP (ref 7–23)
BUN SERPL-MCNC: 9 MG/DL — SIGNIFICANT CHANGE UP (ref 7–23)
CALCIUM SERPL-MCNC: 6.5 MG/DL — CRITICAL LOW (ref 8.4–10.5)
CALCIUM SERPL-MCNC: 7 MG/DL — LOW (ref 8.4–10.5)
CHLORIDE SERPL-SCNC: 86 MMOL/L — LOW (ref 96–108)
CHLORIDE SERPL-SCNC: 94 MMOL/L — LOW (ref 96–108)
CO2 SERPL-SCNC: 22 MMOL/L — SIGNIFICANT CHANGE UP (ref 22–31)
CO2 SERPL-SCNC: 24 MMOL/L — SIGNIFICANT CHANGE UP (ref 22–31)
CREAT SERPL-MCNC: 0.55 MG/DL — SIGNIFICANT CHANGE UP (ref 0.5–1.3)
CREAT SERPL-MCNC: 0.57 MG/DL — SIGNIFICANT CHANGE UP (ref 0.5–1.3)
EOSINOPHIL # BLD AUTO: 0.01 K/UL — SIGNIFICANT CHANGE UP (ref 0–0.5)
EOSINOPHIL NFR BLD AUTO: 0.1 % — SIGNIFICANT CHANGE UP (ref 0–6)
FIBRINOGEN PPP-MCNC: 365 MG/DL — SIGNIFICANT CHANGE UP (ref 350–510)
GLUCOSE SERPL-MCNC: 109 MG/DL — HIGH (ref 70–99)
GLUCOSE SERPL-MCNC: 118 MG/DL — HIGH (ref 70–99)
HCT VFR BLD CALC: 26.6 % — LOW (ref 34.5–45)
HGB BLD-MCNC: 8.9 G/DL — LOW (ref 11.5–15.5)
IMM GRANULOCYTES NFR BLD AUTO: 1 % — SIGNIFICANT CHANGE UP (ref 0–1.5)
INR BLD: 0.81 RATIO — LOW (ref 0.88–1.16)
LDH SERPL L TO P-CCNC: 326 U/L — HIGH (ref 50–242)
LYMPHOCYTES # BLD AUTO: 14.6 % — SIGNIFICANT CHANGE UP (ref 13–44)
LYMPHOCYTES # BLD AUTO: 2.57 K/UL — SIGNIFICANT CHANGE UP (ref 1–3.3)
MCHC RBC-ENTMCNC: 33.5 GM/DL — SIGNIFICANT CHANGE UP (ref 32–36)
MCHC RBC-ENTMCNC: 33.5 PG — SIGNIFICANT CHANGE UP (ref 27–34)
MCV RBC AUTO: 100 FL — SIGNIFICANT CHANGE UP (ref 80–100)
MONOCYTES # BLD AUTO: 0.8 K/UL — SIGNIFICANT CHANGE UP (ref 0–0.9)
MONOCYTES NFR BLD AUTO: 4.5 % — SIGNIFICANT CHANGE UP (ref 2–14)
NEUTROPHILS # BLD AUTO: 14.07 K/UL — HIGH (ref 1.8–7.4)
NEUTROPHILS NFR BLD AUTO: 79.6 % — HIGH (ref 43–77)
NRBC # BLD: 0 /100 WBCS — SIGNIFICANT CHANGE UP (ref 0–0)
PLATELET # BLD AUTO: 128 K/UL — LOW (ref 150–400)
POTASSIUM SERPL-MCNC: 4 MMOL/L — SIGNIFICANT CHANGE UP (ref 3.5–5.3)
POTASSIUM SERPL-MCNC: 4.5 MMOL/L — SIGNIFICANT CHANGE UP (ref 3.5–5.3)
POTASSIUM SERPL-SCNC: 4 MMOL/L — SIGNIFICANT CHANGE UP (ref 3.5–5.3)
POTASSIUM SERPL-SCNC: 4.5 MMOL/L — SIGNIFICANT CHANGE UP (ref 3.5–5.3)
PROT SERPL-MCNC: 5.2 G/DL — LOW (ref 6–8.3)
PROT SERPL-MCNC: 5.4 G/DL — LOW (ref 6–8.3)
PROTHROM AB SERPL-ACNC: 9.3 SEC — LOW (ref 10–12.9)
RBC # BLD: 2.66 M/UL — LOW (ref 3.8–5.2)
RBC # FLD: 14.4 % — SIGNIFICANT CHANGE UP (ref 10.3–14.5)
SODIUM SERPL-SCNC: 122 MMOL/L — LOW (ref 135–145)
SODIUM SERPL-SCNC: 128 MMOL/L — LOW (ref 135–145)
WBC # BLD: 17.65 K/UL — HIGH (ref 3.8–10.5)
WBC # FLD AUTO: 17.65 K/UL — HIGH (ref 3.8–10.5)

## 2019-12-11 RX ORDER — SODIUM CHLORIDE 9 MG/ML
1000 INJECTION INTRAMUSCULAR; INTRAVENOUS; SUBCUTANEOUS
Refills: 0 | Status: DISCONTINUED | OUTPATIENT
Start: 2019-12-11 | End: 2019-12-14

## 2019-12-11 RX ORDER — ACETAMINOPHEN 500 MG
975 TABLET ORAL EVERY 6 HOURS
Refills: 0 | Status: DISCONTINUED | OUTPATIENT
Start: 2019-12-11 | End: 2019-12-14

## 2019-12-11 RX ORDER — IBUPROFEN 200 MG
600 TABLET ORAL EVERY 6 HOURS
Refills: 0 | Status: DISCONTINUED | OUTPATIENT
Start: 2019-12-11 | End: 2019-12-14

## 2019-12-11 RX ADMIN — Medication 600 MILLIGRAM(S): at 11:11

## 2019-12-11 RX ADMIN — Medication 975 MILLIGRAM(S): at 23:52

## 2019-12-11 RX ADMIN — Medication 975 MILLIGRAM(S): at 07:40

## 2019-12-11 RX ADMIN — HEPARIN SODIUM 5000 UNIT(S): 5000 INJECTION INTRAVENOUS; SUBCUTANEOUS at 06:16

## 2019-12-11 RX ADMIN — HEPARIN SODIUM 5000 UNIT(S): 5000 INJECTION INTRAVENOUS; SUBCUTANEOUS at 22:17

## 2019-12-11 RX ADMIN — Medication 400 MILLIGRAM(S): at 01:03

## 2019-12-11 RX ADMIN — Medication 975 MILLIGRAM(S): at 15:45

## 2019-12-11 RX ADMIN — Medication 975 MILLIGRAM(S): at 22:22

## 2019-12-11 RX ADMIN — Medication 975 MILLIGRAM(S): at 16:15

## 2019-12-11 RX ADMIN — ONDANSETRON 4 MILLIGRAM(S): 8 TABLET, FILM COATED ORAL at 02:18

## 2019-12-11 RX ADMIN — Medication 300 MILLIGRAM(S): at 23:49

## 2019-12-11 RX ADMIN — Medication 600 MILLIGRAM(S): at 23:49

## 2019-12-11 RX ADMIN — Medication 975 MILLIGRAM(S): at 06:17

## 2019-12-11 RX ADMIN — Medication 325 MILLIGRAM(S): at 11:10

## 2019-12-11 RX ADMIN — Medication 600 MILLIGRAM(S): at 17:52

## 2019-12-11 RX ADMIN — Medication 300 MILLIGRAM(S): at 15:45

## 2019-12-11 RX ADMIN — Medication 600 MILLIGRAM(S): at 12:00

## 2019-12-11 RX ADMIN — Medication 1000 MILLIGRAM(S): at 01:45

## 2019-12-11 RX ADMIN — Medication 1 TABLET(S): at 11:10

## 2019-12-11 RX ADMIN — Medication 500 MILLIGRAM(S): at 11:10

## 2019-12-11 RX ADMIN — Medication 600 MILLIGRAM(S): at 17:16

## 2019-12-11 NOTE — PROGRESS NOTE ADULT - ASSESSMENT
29y  now POD1 s/p pLTCS (EBL 1000) at 34w0d for abrution, c/b sPEC. Labs overnight notable for hyponatremia/hypochloremia.  Pt is stable and afebrile.

## 2019-12-11 NOTE — PROGRESS NOTE ADULT - SUBJECTIVE AND OBJECTIVE BOX
R3 OB Postpartum Progress Note    HD#2, POD#1    SUBJECTIVE:    Pt seen and examined at bedside.  Pt reports pain is well controlled.  She denies HA, visual changes, sob, cp, ruq/epigastric pain.  She denies fevers, chills, dizziness.  She is ambulating, and tolerating po.      OBJECTIVE:    Vital Signs Last 24 Hours  T(C): 36.6 (19 @ 00:41), Max: 37.5 (12-10-19 @ 11:20)  HR: 84 (19 @ 00:41) (77 - 102)  BP: 124/82 (19 @ 00:41) (114/78 - 150/93)  RR: 18 (19 @ 00:41) (5 - 32)  SpO2: 98% (19 @ 00:41) (94% - 98%)    I&O's Summary    09 Dec 2019 07:  -  10 Dec 2019 07:00  --------------------------------------------------------  IN: 400 mL / OUT: 3900 mL / NET: -3500 mL    10 Dec 2019 07:01  -  11 Dec 2019 05:03  --------------------------------------------------------  IN: 500 mL / OUT: 4950 mL / NET: -4450 mL      GEN: nad, a&ox3  CV: rrr  PULM: ctab  ABD: softly distended, appropriately tender  INCISION: c/d/i  EXT: wwp, nt      Labs:                        9.4    22.11 )-----------( 123      ( 10 Dec 2019 22:36 )             27.8   baso 0.2    eos 0.0    imm gran 1.1    lymph 10.0   mono 4.9    poly 83.8                         10.6   22.50 )-----------( 139      ( 10 Dec 2019 16:25 )             31.8   baso 0.1    eos 0.0    imm gran 1.2    lymph 8.5    mono 5.3    poly 84.9                         10.2   18.87 )-----------( 131      ( 10 Dec 2019 10:39 )             30.5   baso 0.1    eos 0.0    imm gran 1.5    lymph 7.2    mono 2.4    poly 88.8                         10.8   14.19 )-----------( 125      ( 10 Dec 2019 03:45 )             32.8   baso 0.3    eos 0.2    imm gran 1.8    lymph 13.9   mono 3.0    poly 80.8                         11.6   12.83 )-----------( 144      ( 10 Dec 2019 00:39 )             35.3   baso 0.2    eos 0.5    imm gran 0.9    lymph 26.3   mono 6.2    poly 65.9     12-11    122<L>  |  86<L>  |  9   ----------------------------<  109<H>  4.5   |  24  |  0.55    Ca    6.5<LL>      11 Dec 2019 00:43  Mg     5.9     12-10    TPro  5.4<L>  /  Alb  2.9<L>  /  TBili  <0.1<L>  /  DBili  x   /  AST  31  /  ALT  11  /  AlkPhos  178<H>  12-11    PT/INR - ( 10 Dec 2019 22:36 )   PT: 9.9 sec;   INR: 0.86 ratio         PTT - ( 10 Dec 2019 22:36 )  PTT:28.0 sec  Urinalysis Basic - ( 10 Dec 2019 00:49 )    Color: LIGHT BROWN / Appearance: Slightly Turbid / S.008 / pH: x  Gluc: x / Ketone: Negative  / Bili: Negative / Urobili: Negative   Blood: x / Protein: 100 / Nitrite: Negative   Leuk Esterase: Negative / RBC: 305 /hpf / WBC 1 /HPF   Sq Epi: x / Non Sq Epi: 0 /hpf / Bacteria: Negative        Blood Type: A Positive      MEDICATIONS  (STANDING):  acetaminophen   Tablet .. 975 milliGRAM(s) Oral every 6 hours  acetaminophen  IVPB .. 1000 milliGRAM(s) IV Intermittent every 6 hours  ascorbic acid 500 milliGRAM(s) Oral daily  diphtheria/tetanus/pertussis (acellular) Vaccine (ADAcel) 0.5 milliLiter(s) IntraMuscular once  ferrous    sulfate 325 milliGRAM(s) Oral daily  heparin  Injectable 5000 Unit(s) SubCutaneous every 12 hours  HYDROmorphone PCA (1 mG/mL) 30 milliLiter(s) PCA Continuous PCA Continuous  labetalol 300 milliGRAM(s) Oral three times a day  magnesium sulfate Infusion 2 Gm/Hr (50 mL/Hr) IV Continuous <Continuous>  oxytocin Infusion 333.333 milliUNIT(s)/Min (1000 mL/Hr) IV Continuous <Continuous>  prenatal multivitamin 1 Tablet(s) Oral daily  sodium chloride 0.9%. 1000 milliLiter(s) (30 mL/Hr) IV Continuous <Continuous>    MEDICATIONS  (PRN):  diphenhydrAMINE 25 milliGRAM(s) Oral every 6 hours PRN Itching  glycerin Suppository - Adult 1 Suppository(s) Rectal at bedtime PRN Constipation  HYDROmorphone PCA (1 mG/mL) Rescue Clinician Bolus 0.5 milliGRAM(s) IV Push every 15 minutes PRN for Pain Scale GREATER THAN 6  lanolin Ointment 1 Application(s) Topical every 6 hours PRN Sore Nipples  magnesium hydroxide Suspension 30 milliLiter(s) Oral two times a day PRN Constipation  naloxone Injectable 0.1 milliGRAM(s) IV Push every 3 minutes PRN For ANY of the following changes in patient status:  A. RR LESS THAN 10 breaths per minute, B. Oxygen saturation LESS THAN 90%, C. Sedation score of 6  ondansetron Injectable 4 milliGRAM(s) IV Push every 6 hours PRN Nausea  oxyCODONE    IR 5 milliGRAM(s) Oral every 3 hours PRN Moderate to Severe Pain (4-10)  oxyCODONE    IR 5 milliGRAM(s) Oral once PRN Moderate to Severe Pain (4-10)  simethicone 80 milliGRAM(s) Chew every 4 hours PRN Gas R3 OB Postpartum Progress Note    HD#2, POD#1    SUBJECTIVE:    Pt seen and examined at bedside.  Pt reports pain is well controlled.  She denies HA, visual changes, sob, cp, ruq/epigastric pain.  She denies fevers, chills, dizziness.  She is ambulating, and tolerating po.  Arana catheter is in place.    OBJECTIVE:    Vital Signs Last 24 Hours  T(C): 36.6 (19 @ 00:41), Max: 37.5 (12-10-19 @ 11:20)  HR: 84 (19 @ 00:41) (77 - 102)  BP: 124/82 (19 @ 00:41) (114/78 - 150/93)  RR: 18 (19 @ 00:41) (5 - 32)  SpO2: 98% (19 @ 00:41) (94% - 98%)    I&O's Summary    09 Dec 2019 07:01  -  10 Dec 2019 07:00  --------------------------------------------------------  IN: 400 mL / OUT: 3900 mL / NET: -3500 mL    10 Dec 2019 07:01  -  11 Dec 2019 05:03  --------------------------------------------------------  IN: 500 mL / OUT: 4950 mL / NET: -4450 mL      GEN: nad, a&ox3  CV: rrr  PULM: ctab  ABD: softly distended, appropriately tender  INCISION: c/d/i  EXT: wwp, nt      Labs:                        9.4    22.11 )-----------( 123      ( 10 Dec 2019 22:36 )             27.8   baso 0.2    eos 0.0    imm gran 1.1    lymph 10.0   mono 4.9    poly 83.8                         10.6   22.50 )-----------( 139      ( 10 Dec 2019 16:25 )             31.8   baso 0.1    eos 0.0    imm gran 1.2    lymph 8.5    mono 5.3    poly 84.9                         10.2   18.87 )-----------( 131      ( 10 Dec 2019 10:39 )             30.5   baso 0.1    eos 0.0    imm gran 1.5    lymph 7.2    mono 2.4    poly 88.8                         10.8   14.19 )-----------( 125      ( 10 Dec 2019 03:45 )             32.8   baso 0.3    eos 0.2    imm gran 1.8    lymph 13.9   mono 3.0    poly 80.8                         11.6   12.83 )-----------( 144      ( 10 Dec 2019 00:39 )             35.3   baso 0.2    eos 0.5    imm gran 0.9    lymph 26.3   mono 6.2    poly 65.9     12-11    122<L>  |  86<L>  |  9   ----------------------------<  109<H>  4.5   |  24  |  0.55    Ca    6.5<LL>      11 Dec 2019 00:43  Mg     5.9     12-10    TPro  5.4<L>  /  Alb  2.9<L>  /  TBili  <0.1<L>  /  DBili  x   /  AST  31  /  ALT  11  /  AlkPhos  178<H>  12-11    PT/INR - ( 10 Dec 2019 22:36 )   PT: 9.9 sec;   INR: 0.86 ratio         PTT - ( 10 Dec 2019 22:36 )  PTT:28.0 sec  Urinalysis Basic - ( 10 Dec 2019 00:49 )    Color: LIGHT BROWN / Appearance: Slightly Turbid / S.008 / pH: x  Gluc: x / Ketone: Negative  / Bili: Negative / Urobili: Negative   Blood: x / Protein: 100 / Nitrite: Negative   Leuk Esterase: Negative / RBC: 305 /hpf / WBC 1 /HPF   Sq Epi: x / Non Sq Epi: 0 /hpf / Bacteria: Negative        Blood Type: A Positive      MEDICATIONS  (STANDING):  acetaminophen   Tablet .. 975 milliGRAM(s) Oral every 6 hours  acetaminophen  IVPB .. 1000 milliGRAM(s) IV Intermittent every 6 hours  ascorbic acid 500 milliGRAM(s) Oral daily  diphtheria/tetanus/pertussis (acellular) Vaccine (ADAcel) 0.5 milliLiter(s) IntraMuscular once  ferrous    sulfate 325 milliGRAM(s) Oral daily  heparin  Injectable 5000 Unit(s) SubCutaneous every 12 hours  HYDROmorphone PCA (1 mG/mL) 30 milliLiter(s) PCA Continuous PCA Continuous  labetalol 300 milliGRAM(s) Oral three times a day  magnesium sulfate Infusion 2 Gm/Hr (50 mL/Hr) IV Continuous <Continuous>  oxytocin Infusion 333.333 milliUNIT(s)/Min (1000 mL/Hr) IV Continuous <Continuous>  prenatal multivitamin 1 Tablet(s) Oral daily  sodium chloride 0.9%. 1000 milliLiter(s) (30 mL/Hr) IV Continuous <Continuous>    MEDICATIONS  (PRN):  diphenhydrAMINE 25 milliGRAM(s) Oral every 6 hours PRN Itching  glycerin Suppository - Adult 1 Suppository(s) Rectal at bedtime PRN Constipation  HYDROmorphone PCA (1 mG/mL) Rescue Clinician Bolus 0.5 milliGRAM(s) IV Push every 15 minutes PRN for Pain Scale GREATER THAN 6  lanolin Ointment 1 Application(s) Topical every 6 hours PRN Sore Nipples  magnesium hydroxide Suspension 30 milliLiter(s) Oral two times a day PRN Constipation  naloxone Injectable 0.1 milliGRAM(s) IV Push every 3 minutes PRN For ANY of the following changes in patient status:  A. RR LESS THAN 10 breaths per minute, B. Oxygen saturation LESS THAN 90%, C. Sedation score of 6  ondansetron Injectable 4 milliGRAM(s) IV Push every 6 hours PRN Nausea  oxyCODONE    IR 5 milliGRAM(s) Oral every 3 hours PRN Moderate to Severe Pain (4-10)  oxyCODONE    IR 5 milliGRAM(s) Oral once PRN Moderate to Severe Pain (4-10)  simethicone 80 milliGRAM(s) Chew every 4 hours PRN Gas

## 2019-12-11 NOTE — PROGRESS NOTE ADULT - PROBLEM SELECTOR PLAN 1
- continue routine postop pain meds  - continue hsq/venodynes for vte ppx  - continue reg diet, KVO  - continue routine postop care - continue routine postop pain meds  - continue hsq/venodynes for vte ppx  - continue reg diet, KVO  - d/c osuna  - continue routine postop care

## 2019-12-11 NOTE — PROGRESS NOTE ADULT - SUBJECTIVE AND OBJECTIVE BOX
Day 1 of Anesthesia Pain Management Service    SUBJECTIVE: Doing well    Pain Scale Score:	[X] Refer to charted pain scores    THERAPY:    [ ] IV PCA Morphine		        [ ] 5 mg/mL	[ ] 1 mg/mL  [X] IV PCA Hydromorphone	[ ] 5 mg/mL	[X] 1 mg/mL  [ ] IV PCA Fentanyl		        [ ] 50 micrograms/mL    Demand dose: 0.2 mg     Lockout: 6 minutes   Continuous Rate: 0 mg/hr  4 Hour Limit: 4 mg    MEDICATIONS  (STANDING):  acetaminophen   Tablet .. 975 milliGRAM(s) Oral every 6 hours  acetaminophen  IVPB .. 1000 milliGRAM(s) IV Intermittent every 6 hours  ascorbic acid 500 milliGRAM(s) Oral daily  diphtheria/tetanus/pertussis (acellular) Vaccine (ADAcel) 0.5 milliLiter(s) IntraMuscular once  ferrous    sulfate 325 milliGRAM(s) Oral daily  heparin  Injectable 5000 Unit(s) SubCutaneous every 12 hours  HYDROmorphone PCA (1 mG/mL) 30 milliLiter(s) PCA Continuous PCA Continuous  labetalol 300 milliGRAM(s) Oral three times a day  magnesium sulfate Infusion 2 Gm/Hr (50 mL/Hr) IV Continuous <Continuous>  oxytocin Infusion 333.333 milliUNIT(s)/Min (1000 mL/Hr) IV Continuous <Continuous>  prenatal multivitamin 1 Tablet(s) Oral daily  sodium chloride 0.9%. 1000 milliLiter(s) (30 mL/Hr) IV Continuous <Continuous>    MEDICATIONS  (PRN):  diphenhydrAMINE 25 milliGRAM(s) Oral every 6 hours PRN Itching  glycerin Suppository - Adult 1 Suppository(s) Rectal at bedtime PRN Constipation  HYDROmorphone PCA (1 mG/mL) Rescue Clinician Bolus 0.5 milliGRAM(s) IV Push every 15 minutes PRN for Pain Scale GREATER THAN 6  lanolin Ointment 1 Application(s) Topical every 6 hours PRN Sore Nipples  magnesium hydroxide Suspension 30 milliLiter(s) Oral two times a day PRN Constipation  naloxone Injectable 0.1 milliGRAM(s) IV Push every 3 minutes PRN For ANY of the following changes in patient status:  A. RR LESS THAN 10 breaths per minute, B. Oxygen saturation LESS THAN 90%, C. Sedation score of 6  ondansetron Injectable 4 milliGRAM(s) IV Push every 6 hours PRN Nausea  oxyCODONE    IR 5 milliGRAM(s) Oral every 3 hours PRN Moderate to Severe Pain (4-10)  oxyCODONE    IR 5 milliGRAM(s) Oral once PRN Moderate to Severe Pain (4-10)  simethicone 80 milliGRAM(s) Chew every 4 hours PRN Gas      OBJECTIVE:    Sedation Score:	[ X] Alert	 [ ] Drowsy 	[ ] Arousable	[ ] Asleep	[ ] Unresponsive    Side Effects:	[X ] None	[ ] Nausea	[ ] Vomiting	[ ] Pruritus  		[ ] Other:    Vital Signs Last 24 Hrs  T(C): 36.7 (11 Dec 2019 05:38), Max: 37.5 (10 Dec 2019 11:20)  T(F): 98.1 (11 Dec 2019 05:38), Max: 99.5 (10 Dec 2019 11:20)  HR: 87 (11 Dec 2019 06:15) (77 - 93)  BP: 100/66 (11 Dec 2019 06:15) (100/66 - 142/90)  BP(mean): 98 (10 Dec 2019 10:30) (96 - 114)  RR: 18 (11 Dec 2019 05:38) (5 - 31)  SpO2: 96% (11 Dec 2019 05:38) (94% - 98%)    ASSESSMENT/ PLAN    Therapy to  be:               [  ] Continued   [X ] Discontinued   [ X] Changed to PRN Analgesics    Documentation and Verification of current medications:   [X] Done	[ ] Not done, not eligible    Comments: D\C PCA transition to po analgesics prn.

## 2019-12-11 NOTE — CHART NOTE - NSCHARTNOTEFT_GEN_A_CORE
R3 OB PP Chart Note    Pt seen and evaluated at bedside due to hyponatremic hypocholoremia seen on PM hellp labs approx 12pm.  Pt had nausea and dizziness.  She denies HA, visual changes, SOB, CP, palpitations, abd pain.  Arana in place draining large volume clear dilute UOP.  Exam wnl.    BP was well controlled and Mg was stopped 2h early.   Stat repeat CMP continued to show low Na/Cl as below:    12-11    122<L>  |  86<L>  |  9   ----------------------------<  109<H>  4.5   |  24  |  0.55    Ca    6.5<LL>      11 Dec 2019 00:43  Mg     5.9     12-10    TPro  5.4<L>  /  Alb  2.9<L>  /  TBili  <0.1<L>  /  DBili  x   /  AST  31  /  ALT  11  /  AlkPhos  178<H>  12-11      Pt reevaluated at bedside approx 1:45a.  Her nausea and dizziness had resolved.  Exam stable.    Pt instructed to take po (cheese and pretzels) with plan to repeat labs in AM.   KVO w/ NS at 30cc/h.  Continue Arana w/ strict I/Os.      d/w Dr. Lamine Cavazos MD PGY3

## 2019-12-12 ENCOUNTER — APPOINTMENT (OUTPATIENT)
Dept: ANTEPARTUM | Facility: CLINIC | Age: 29
End: 2019-12-12

## 2019-12-12 ENCOUNTER — APPOINTMENT (OUTPATIENT)
Dept: OBGYN | Facility: CLINIC | Age: 29
End: 2019-12-12

## 2019-12-12 LAB
ALBUMIN SERPL ELPH-MCNC: 2.6 G/DL — LOW (ref 3.3–5)
ALP SERPL-CCNC: 150 U/L — HIGH (ref 40–120)
ALT FLD-CCNC: 14 U/L — SIGNIFICANT CHANGE UP (ref 10–45)
ANION GAP SERPL CALC-SCNC: 13 MMOL/L — SIGNIFICANT CHANGE UP (ref 5–17)
APTT BLD: 32.7 SEC — SIGNIFICANT CHANGE UP (ref 27.5–36.3)
AST SERPL-CCNC: 31 U/L — SIGNIFICANT CHANGE UP (ref 10–40)
BASOPHILS # BLD AUTO: 0.05 K/UL — SIGNIFICANT CHANGE UP (ref 0–0.2)
BASOPHILS NFR BLD AUTO: 0.3 % — SIGNIFICANT CHANGE UP (ref 0–2)
BILIRUB SERPL-MCNC: <0.1 MG/DL — LOW (ref 0.2–1.2)
BUN SERPL-MCNC: 16 MG/DL — SIGNIFICANT CHANGE UP (ref 7–23)
CALCIUM SERPL-MCNC: 8.2 MG/DL — LOW (ref 8.4–10.5)
CHLORIDE SERPL-SCNC: 99 MMOL/L — SIGNIFICANT CHANGE UP (ref 96–108)
CO2 SERPL-SCNC: 22 MMOL/L — SIGNIFICANT CHANGE UP (ref 22–31)
CREAT SERPL-MCNC: 0.62 MG/DL — SIGNIFICANT CHANGE UP (ref 0.5–1.3)
EOSINOPHIL # BLD AUTO: 0.07 K/UL — SIGNIFICANT CHANGE UP (ref 0–0.5)
EOSINOPHIL NFR BLD AUTO: 0.5 % — SIGNIFICANT CHANGE UP (ref 0–6)
FIBRINOGEN PPP-MCNC: 339 MG/DL — LOW (ref 350–510)
GLUCOSE SERPL-MCNC: 97 MG/DL — SIGNIFICANT CHANGE UP (ref 70–99)
HCT VFR BLD CALC: 26.7 % — LOW (ref 34.5–45)
HGB BLD-MCNC: 8.7 G/DL — LOW (ref 11.5–15.5)
IMM GRANULOCYTES NFR BLD AUTO: 1.5 % — SIGNIFICANT CHANGE UP (ref 0–1.5)
INR BLD: 0.86 RATIO — LOW (ref 0.88–1.16)
LDH SERPL L TO P-CCNC: 315 U/L — HIGH (ref 50–242)
LYMPHOCYTES # BLD AUTO: 23.5 % — SIGNIFICANT CHANGE UP (ref 13–44)
LYMPHOCYTES # BLD AUTO: 3.58 K/UL — HIGH (ref 1–3.3)
MCHC RBC-ENTMCNC: 32.6 GM/DL — SIGNIFICANT CHANGE UP (ref 32–36)
MCHC RBC-ENTMCNC: 34 PG — SIGNIFICANT CHANGE UP (ref 27–34)
MCV RBC AUTO: 104.3 FL — HIGH (ref 80–100)
MONOCYTES # BLD AUTO: 1.06 K/UL — HIGH (ref 0–0.9)
MONOCYTES NFR BLD AUTO: 7 % — SIGNIFICANT CHANGE UP (ref 2–14)
NEUTROPHILS # BLD AUTO: 10.24 K/UL — HIGH (ref 1.8–7.4)
NEUTROPHILS NFR BLD AUTO: 67.2 % — SIGNIFICANT CHANGE UP (ref 43–77)
NRBC # BLD: 0 /100 WBCS — SIGNIFICANT CHANGE UP (ref 0–0)
PLATELET # BLD AUTO: 141 K/UL — LOW (ref 150–400)
POTASSIUM SERPL-MCNC: 4.5 MMOL/L — SIGNIFICANT CHANGE UP (ref 3.5–5.3)
POTASSIUM SERPL-SCNC: 4.5 MMOL/L — SIGNIFICANT CHANGE UP (ref 3.5–5.3)
PROT SERPL-MCNC: 5.4 G/DL — LOW (ref 6–8.3)
PROTHROM AB SERPL-ACNC: 9.7 SEC — LOW (ref 10–12.9)
RBC # BLD: 2.56 M/UL — LOW (ref 3.8–5.2)
RBC # FLD: 15.3 % — HIGH (ref 10.3–14.5)
SODIUM SERPL-SCNC: 134 MMOL/L — LOW (ref 135–145)
URATE SERPL-MCNC: 4.9 MG/DL — SIGNIFICANT CHANGE UP (ref 2.5–7)
WBC # BLD: 15.23 K/UL — HIGH (ref 3.8–10.5)
WBC # FLD AUTO: 15.23 K/UL — HIGH (ref 3.8–10.5)

## 2019-12-12 RX ADMIN — Medication 300 MILLIGRAM(S): at 16:11

## 2019-12-12 RX ADMIN — Medication 975 MILLIGRAM(S): at 21:45

## 2019-12-12 RX ADMIN — Medication 600 MILLIGRAM(S): at 01:44

## 2019-12-12 RX ADMIN — Medication 975 MILLIGRAM(S): at 16:11

## 2019-12-12 RX ADMIN — Medication 975 MILLIGRAM(S): at 16:55

## 2019-12-12 RX ADMIN — Medication 975 MILLIGRAM(S): at 10:16

## 2019-12-12 RX ADMIN — Medication 325 MILLIGRAM(S): at 13:01

## 2019-12-12 RX ADMIN — Medication 500 MILLIGRAM(S): at 13:01

## 2019-12-12 RX ADMIN — SIMETHICONE 80 MILLIGRAM(S): 80 TABLET, CHEWABLE ORAL at 13:01

## 2019-12-12 RX ADMIN — Medication 1 TABLET(S): at 13:01

## 2019-12-12 RX ADMIN — HEPARIN SODIUM 5000 UNIT(S): 5000 INJECTION INTRAVENOUS; SUBCUTANEOUS at 21:44

## 2019-12-12 RX ADMIN — Medication 300 MILLIGRAM(S): at 08:59

## 2019-12-12 RX ADMIN — Medication 600 MILLIGRAM(S): at 13:01

## 2019-12-12 RX ADMIN — Medication 600 MILLIGRAM(S): at 06:46

## 2019-12-12 RX ADMIN — Medication 600 MILLIGRAM(S): at 13:55

## 2019-12-12 RX ADMIN — Medication 975 MILLIGRAM(S): at 03:48

## 2019-12-12 RX ADMIN — HEPARIN SODIUM 5000 UNIT(S): 5000 INJECTION INTRAVENOUS; SUBCUTANEOUS at 08:59

## 2019-12-12 RX ADMIN — Medication 600 MILLIGRAM(S): at 18:45

## 2019-12-12 RX ADMIN — Medication 975 MILLIGRAM(S): at 09:01

## 2019-12-12 RX ADMIN — Medication 600 MILLIGRAM(S): at 19:09

## 2019-12-12 RX ADMIN — Medication 975 MILLIGRAM(S): at 22:25

## 2019-12-12 RX ADMIN — Medication 975 MILLIGRAM(S): at 04:44

## 2019-12-12 NOTE — PROGRESS NOTE ADULT - PROBLEM SELECTOR PLAN 1
- continue routine postop pain meds  - continue hsq/venodynes for vte ppx  - continue reg diet, SLIV  - continue routine postop care

## 2019-12-12 NOTE — PROGRESS NOTE ADULT - SUBJECTIVE AND OBJECTIVE BOX
R3 OB Postpartum Progress Note    HD#3, POD#2    SUBJECTIVE:    Pt seen and examined at bedside.  Pt reports pain is well controlled.  She denies   HA, visual changes, sob, cp, ruq/epigastric pain.  She denies fevers, chills,   dizziness.  She is ambulating, voiding, and tolerating po.      OBJECTIVE:    Vital Signs Last 24 Hours  T(C): 36.9 (12-12-19 @ 00:55), Max: 37.2 (12-11-19 @ 14:38)  HR: 88 (12-12-19 @ 00:55) (81 - 90)  BP: 104/68 (12-12-19 @ 00:55) (100/66 - 128/81)  RR: 18 (12-12-19 @ 00:55) (18 - 18)  SpO2: 97% (12-12-19 @ 00:55) (96% - 98%)    I&O's Summary    10 Dec 2019 07:01  -  11 Dec 2019 07:00  --------------------------------------------------------  IN: 500 mL / OUT: 4950 mL / NET: -4450 mL    11 Dec 2019 07:01  -  12 Dec 2019 04:25  --------------------------------------------------------  IN: 0 mL / OUT: 800 mL / NET: -800 mL    GEN: nad, a&ox3  CV: rrr  PULM: ctab  ABD: softly distended, appropriately tender  INCISION: c/d/i  EXT: wwp, nt      Labs:                        8.9    17.65 )-----------( 128      ( 11 Dec 2019 07:21 )             26.6   baso 0.2    eos 0.1    imm gran 1.0    lymph 14.6   mono 4.5    poly 79.6                         9.4    22.11 )-----------( 123      ( 10 Dec 2019 22:36 )             27.8   baso 0.2    eos 0.0    imm gran 1.1    lymph 10.0   mono 4.9    poly 83.8                         10.6   22.50 )-----------( 139      ( 10 Dec 2019 16:25 )             31.8   baso 0.1    eos 0.0    imm gran 1.2    lymph 8.5    mono 5.3    poly 84.9                         10.2   18.87 )-----------( 131      ( 10 Dec 2019 10:39 )             30.5   baso 0.1    eos 0.0    imm gran 1.5    lymph 7.2    mono 2.4    poly 88.8                         10.8   14.19 )-----------( 125      ( 10 Dec 2019 03:45 )             32.8   baso 0.3    eos 0.2    imm gran 1.8    lymph 13.9   mono 3.0    poly 80.8                         11.6   12.83 )-----------( 144      ( 10 Dec 2019 00:39 )             35.3   baso 0.2    eos 0.5    imm gran 0.9    lymph 26.3   mono 6.2    poly 65.9     12-11    128<L>  |  94<L>  |  9   ----------------------------<  118<H>  4.0   |  22  |  0.57    Ca    7.0<L>      11 Dec 2019 07:21  Mg     5.9     12-10    TPro  5.2<L>  /  Alb  2.6<L>  /  TBili  <0.1<L>  /  DBili  x   /  AST  29  /  ALT  13  /  AlkPhos  158<H>  12-11    PT/INR - ( 11 Dec 2019 07:21 )   PT: 9.3 sec;   INR: 0.81 ratio         PTT - ( 11 Dec 2019 07:21 )  PTT:26.0 sec      Blood Type: A Positive      MEDICATIONS  (STANDING):  acetaminophen   Tablet .. 975 milliGRAM(s) Oral every 6 hours  acetaminophen  IVPB .. 1000 milliGRAM(s) IV Intermittent every 6 hours  ascorbic acid 500 milliGRAM(s) Oral daily  diphtheria/tetanus/pertussis (acellular) Vaccine (ADAcel) 0.5 milliLiter(s) IntraMuscular once  ferrous    sulfate 325 milliGRAM(s) Oral daily  heparin  Injectable 5000 Unit(s) SubCutaneous every 12 hours  ibuprofen  Tablet. 600 milliGRAM(s) Oral every 6 hours  labetalol 300 milliGRAM(s) Oral three times a day  magnesium sulfate Infusion 2 Gm/Hr (50 mL/Hr) IV Continuous <Continuous>  prenatal multivitamin 1 Tablet(s) Oral daily  sodium chloride 0.9%. 1000 milliLiter(s) (30 mL/Hr) IV Continuous <Continuous>    MEDICATIONS  (PRN):  diphenhydrAMINE 25 milliGRAM(s) Oral every 6 hours PRN Itching  glycerin Suppository - Adult 1 Suppository(s) Rectal at bedtime PRN Constipation  lanolin Ointment 1 Application(s) Topical every 6 hours PRN Sore Nipples  magnesium hydroxide Suspension 30 milliLiter(s) Oral two times a day PRN Constipation  oxyCODONE    IR 5 milliGRAM(s) Oral every 3 hours PRN Moderate to Severe Pain (4-10)  oxyCODONE    IR 5 milliGRAM(s) Oral once PRN Moderate to Severe Pain (4-10)  simethicone 80 milliGRAM(s) Chew every 4 hours PRN Gas R3 OB Postpartum Progress Note    HD#3, POD#2    SUBJECTIVE:  Pt seen and examined at bedside.  Pt reports pain is well controlled.  She has mild HA that has been improved somewhat with tylenol/motrin.  She denies visual changes, sob, cp, ruq/epigastric pain.  She denies fevers, chills, dizziness.  She is ambulating, voiding, and tolerating po.      OBJECTIVE:    Vital Signs Last 24 Hours  T(C): 36.9 (12-12-19 @ 00:55), Max: 37.2 (12-11-19 @ 14:38)  HR: 88 (12-12-19 @ 00:55) (81 - 90)  BP: 104/68 (12-12-19 @ 00:55) (100/66 - 128/81)  RR: 18 (12-12-19 @ 00:55) (18 - 18)  SpO2: 97% (12-12-19 @ 00:55) (96% - 98%)    I&O's Summary    10 Dec 2019 07:01  -  11 Dec 2019 07:00  --------------------------------------------------------  IN: 500 mL / OUT: 4950 mL / NET: -4450 mL    11 Dec 2019 07:01  -  12 Dec 2019 04:25  --------------------------------------------------------  IN: 0 mL / OUT: 800 mL / NET: -800 mL    GEN: nad, a&ox3  CV: rrr  PULM: ctab  ABD: softly distended, appropriately tender  INCISION: c/d/i  EXT: wwp, nt      Labs:                        8.9    17.65 )-----------( 128      ( 11 Dec 2019 07:21 )             26.6   baso 0.2    eos 0.1    imm gran 1.0    lymph 14.6   mono 4.5    poly 79.6                         9.4    22.11 )-----------( 123      ( 10 Dec 2019 22:36 )             27.8   baso 0.2    eos 0.0    imm gran 1.1    lymph 10.0   mono 4.9    poly 83.8                         10.6   22.50 )-----------( 139      ( 10 Dec 2019 16:25 )             31.8   baso 0.1    eos 0.0    imm gran 1.2    lymph 8.5    mono 5.3    poly 84.9                         10.2   18.87 )-----------( 131      ( 10 Dec 2019 10:39 )             30.5   baso 0.1    eos 0.0    imm gran 1.5    lymph 7.2    mono 2.4    poly 88.8                         10.8   14.19 )-----------( 125      ( 10 Dec 2019 03:45 )             32.8   baso 0.3    eos 0.2    imm gran 1.8    lymph 13.9   mono 3.0    poly 80.8                         11.6   12.83 )-----------( 144      ( 10 Dec 2019 00:39 )             35.3   baso 0.2    eos 0.5    imm gran 0.9    lymph 26.3   mono 6.2    poly 65.9     12-11    128<L>  |  94<L>  |  9   ----------------------------<  118<H>  4.0   |  22  |  0.57    Ca    7.0<L>      11 Dec 2019 07:21  Mg     5.9     12-10    TPro  5.2<L>  /  Alb  2.6<L>  /  TBili  <0.1<L>  /  DBili  x   /  AST  29  /  ALT  13  /  AlkPhos  158<H>  12-11    PT/INR - ( 11 Dec 2019 07:21 )   PT: 9.3 sec;   INR: 0.81 ratio         PTT - ( 11 Dec 2019 07:21 )  PTT:26.0 sec      Blood Type: A Positive      MEDICATIONS  (STANDING):  acetaminophen   Tablet .. 975 milliGRAM(s) Oral every 6 hours  acetaminophen  IVPB .. 1000 milliGRAM(s) IV Intermittent every 6 hours  ascorbic acid 500 milliGRAM(s) Oral daily  diphtheria/tetanus/pertussis (acellular) Vaccine (ADAcel) 0.5 milliLiter(s) IntraMuscular once  ferrous    sulfate 325 milliGRAM(s) Oral daily  heparin  Injectable 5000 Unit(s) SubCutaneous every 12 hours  ibuprofen  Tablet. 600 milliGRAM(s) Oral every 6 hours  labetalol 300 milliGRAM(s) Oral three times a day  magnesium sulfate Infusion 2 Gm/Hr (50 mL/Hr) IV Continuous <Continuous>  prenatal multivitamin 1 Tablet(s) Oral daily  sodium chloride 0.9%. 1000 milliLiter(s) (30 mL/Hr) IV Continuous <Continuous>    MEDICATIONS  (PRN):  diphenhydrAMINE 25 milliGRAM(s) Oral every 6 hours PRN Itching  glycerin Suppository - Adult 1 Suppository(s) Rectal at bedtime PRN Constipation  lanolin Ointment 1 Application(s) Topical every 6 hours PRN Sore Nipples  magnesium hydroxide Suspension 30 milliLiter(s) Oral two times a day PRN Constipation  oxyCODONE    IR 5 milliGRAM(s) Oral every 3 hours PRN Moderate to Severe Pain (4-10)  oxyCODONE    IR 5 milliGRAM(s) Oral once PRN Moderate to Severe Pain (4-10)  simethicone 80 milliGRAM(s) Chew every 4 hours PRN Gas

## 2019-12-12 NOTE — PROGRESS NOTE ADULT - ASSESSMENT
29y  now POD2 s/p pLTCS (EBL 1000) at 34w0d for abrution, c/b sPEC.Pt had  hyponatremia/hypochloremia on POD1 which is resolving.  Pt is stable and afebrile.

## 2019-12-13 ENCOUNTER — TRANSCRIPTION ENCOUNTER (OUTPATIENT)
Age: 29
End: 2019-12-13

## 2019-12-13 LAB
ALBUMIN SERPL ELPH-MCNC: 2.8 G/DL — LOW (ref 3.3–5)
ALP SERPL-CCNC: 131 U/L — HIGH (ref 40–120)
ALT FLD-CCNC: 21 U/L — SIGNIFICANT CHANGE UP (ref 10–45)
ANION GAP SERPL CALC-SCNC: 10 MMOL/L — SIGNIFICANT CHANGE UP (ref 5–17)
ANISOCYTOSIS BLD QL: SLIGHT — SIGNIFICANT CHANGE UP
APTT BLD: 33.7 SEC — SIGNIFICANT CHANGE UP (ref 27.5–36.3)
AST SERPL-CCNC: 32 U/L — SIGNIFICANT CHANGE UP (ref 10–40)
BASOPHILS # BLD AUTO: 0.12 K/UL — SIGNIFICANT CHANGE UP (ref 0–0.2)
BASOPHILS NFR BLD AUTO: 0.9 % — SIGNIFICANT CHANGE UP (ref 0–2)
BILIRUB SERPL-MCNC: <0.1 MG/DL — LOW (ref 0.2–1.2)
BUN SERPL-MCNC: 15 MG/DL — SIGNIFICANT CHANGE UP (ref 7–23)
CALCIUM SERPL-MCNC: 8.8 MG/DL — SIGNIFICANT CHANGE UP (ref 8.4–10.5)
CHLORIDE SERPL-SCNC: 106 MMOL/L — SIGNIFICANT CHANGE UP (ref 96–108)
CO2 SERPL-SCNC: 22 MMOL/L — SIGNIFICANT CHANGE UP (ref 22–31)
CREAT SERPL-MCNC: 0.53 MG/DL — SIGNIFICANT CHANGE UP (ref 0.5–1.3)
EOSINOPHIL # BLD AUTO: 0.12 K/UL — SIGNIFICANT CHANGE UP (ref 0–0.5)
EOSINOPHIL NFR BLD AUTO: 0.9 % — SIGNIFICANT CHANGE UP (ref 0–6)
FIBRINOGEN PPP-MCNC: 346 MG/DL — LOW (ref 350–510)
GIANT PLATELETS BLD QL SMEAR: PRESENT — SIGNIFICANT CHANGE UP
GLUCOSE SERPL-MCNC: 75 MG/DL — SIGNIFICANT CHANGE UP (ref 70–99)
HCT VFR BLD CALC: 27 % — LOW (ref 34.5–45)
HGB BLD-MCNC: 8.8 G/DL — LOW (ref 11.5–15.5)
INR BLD: 0.87 RATIO — LOW (ref 0.88–1.16)
LDH SERPL L TO P-CCNC: 273 U/L — HIGH (ref 50–242)
LYMPHOCYTES # BLD AUTO: 1.83 K/UL — SIGNIFICANT CHANGE UP (ref 1–3.3)
LYMPHOCYTES # BLD AUTO: 13.9 % — SIGNIFICANT CHANGE UP (ref 13–44)
MACROCYTES BLD QL: SLIGHT — SIGNIFICANT CHANGE UP
MANUAL SMEAR VERIFICATION: SIGNIFICANT CHANGE UP
MCHC RBC-ENTMCNC: 32.6 GM/DL — SIGNIFICANT CHANGE UP (ref 32–36)
MCHC RBC-ENTMCNC: 34.2 PG — HIGH (ref 27–34)
MCV RBC AUTO: 105.1 FL — HIGH (ref 80–100)
MONOCYTES # BLD AUTO: 0 K/UL — SIGNIFICANT CHANGE UP (ref 0–0.9)
MONOCYTES NFR BLD AUTO: 0 % — LOW (ref 2–14)
NEUTROPHILS # BLD AUTO: 9.74 K/UL — HIGH (ref 1.8–7.4)
NEUTROPHILS NFR BLD AUTO: 73 % — SIGNIFICANT CHANGE UP (ref 43–77)
NEUTS BAND # BLD: 0.9 % — SIGNIFICANT CHANGE UP (ref 0–8)
PLAT MORPH BLD: SIGNIFICANT CHANGE UP
PLATELET # BLD AUTO: 157 K/UL — SIGNIFICANT CHANGE UP (ref 150–400)
POTASSIUM SERPL-MCNC: 4.2 MMOL/L — SIGNIFICANT CHANGE UP (ref 3.5–5.3)
POTASSIUM SERPL-SCNC: 4.2 MMOL/L — SIGNIFICANT CHANGE UP (ref 3.5–5.3)
PROMYELOCYTES # FLD: 7.8 % — HIGH (ref 0–0)
PROT SERPL-MCNC: 5.5 G/DL — LOW (ref 6–8.3)
PROTHROM AB SERPL-ACNC: 10 SEC — SIGNIFICANT CHANGE UP (ref 10–12.9)
RBC # BLD: 2.57 M/UL — LOW (ref 3.8–5.2)
RBC # FLD: 15.5 % — HIGH (ref 10.3–14.5)
RBC BLD AUTO: ABNORMAL
SODIUM SERPL-SCNC: 138 MMOL/L — SIGNIFICANT CHANGE UP (ref 135–145)
URATE SERPL-MCNC: 4.7 MG/DL — SIGNIFICANT CHANGE UP (ref 2.5–7)
VARIANT LYMPHS # BLD: 2.6 % — SIGNIFICANT CHANGE UP (ref 0–6)
WBC # BLD: 13.18 K/UL — HIGH (ref 3.8–10.5)
WBC # FLD AUTO: 13.18 K/UL — HIGH (ref 3.8–10.5)

## 2019-12-13 RX ADMIN — Medication 600 MILLIGRAM(S): at 13:18

## 2019-12-13 RX ADMIN — Medication 300 MILLIGRAM(S): at 18:26

## 2019-12-13 RX ADMIN — Medication 300 MILLIGRAM(S): at 09:47

## 2019-12-13 RX ADMIN — Medication 975 MILLIGRAM(S): at 16:07

## 2019-12-13 RX ADMIN — Medication 975 MILLIGRAM(S): at 04:20

## 2019-12-13 RX ADMIN — Medication 600 MILLIGRAM(S): at 13:20

## 2019-12-13 RX ADMIN — Medication 600 MILLIGRAM(S): at 23:14

## 2019-12-13 RX ADMIN — Medication 600 MILLIGRAM(S): at 01:05

## 2019-12-13 RX ADMIN — Medication 600 MILLIGRAM(S): at 01:45

## 2019-12-13 RX ADMIN — Medication 325 MILLIGRAM(S): at 13:19

## 2019-12-13 RX ADMIN — Medication 300 MILLIGRAM(S): at 01:05

## 2019-12-13 RX ADMIN — Medication 975 MILLIGRAM(S): at 03:36

## 2019-12-13 RX ADMIN — Medication 600 MILLIGRAM(S): at 18:26

## 2019-12-13 RX ADMIN — Medication 600 MILLIGRAM(S): at 09:51

## 2019-12-13 RX ADMIN — Medication 975 MILLIGRAM(S): at 16:09

## 2019-12-13 RX ADMIN — Medication 500 MILLIGRAM(S): at 13:18

## 2019-12-13 RX ADMIN — Medication 1 TABLET(S): at 13:18

## 2019-12-13 RX ADMIN — Medication 600 MILLIGRAM(S): at 18:27

## 2019-12-13 RX ADMIN — Medication 975 MILLIGRAM(S): at 09:47

## 2019-12-13 RX ADMIN — HEPARIN SODIUM 5000 UNIT(S): 5000 INJECTION INTRAVENOUS; SUBCUTANEOUS at 09:48

## 2019-12-13 RX ADMIN — Medication 1000 MILLIGRAM(S): at 09:51

## 2019-12-13 RX ADMIN — HEPARIN SODIUM 5000 UNIT(S): 5000 INJECTION INTRAVENOUS; SUBCUTANEOUS at 23:14

## 2019-12-13 RX ADMIN — Medication 975 MILLIGRAM(S): at 09:51

## 2019-12-13 NOTE — DISCHARGE NOTE OB - CARE PROVIDER_API CALL
Saranya Raman)  OBSN  General  [address] [address]  Phone: [phone]  Fax: [phone]  Follow Up Time: Marie Deluca)  Obstetrics and Gynecology  [address] [facility], [address]  [place] [address]  Phone: [phone]  Fax: [phone]  Follow Up Time:

## 2019-12-13 NOTE — DISCHARGE NOTE OB - PATIENT PORTAL LINK FT
You can access the FollowMyHealth Patient Portal offered by [facility] by registering at the following website: [email or web address]. By joining [facility]’s FollowMyHealth portal, you will also be able to view your health information using other applications (apps) compatible with our system.

## 2019-12-13 NOTE — PROGRESS NOTE ADULT - ASSESSMENT
29y  now POD3 s/p pLTCS (EBL 1000) at 34w0d for abrution, c/b sPEC. Pt had hyponatremia/hypochloremia on POD1 which is resolved.  Pt is stable and afebrile.

## 2019-12-13 NOTE — DISCHARGE NOTE OB - PLAN OF CARE
routine postop/postpartum care. routine postop/postpartum care. no heavy lifting >10 lbs and nothing in vagina x 6 weeks. Contact doctor if heavy bleeding (soaking > 1 pad/hr x 2 hrs), increased pain, inability to hold down food or drink, abn purulent discharge from wound or opening of wound. you are able to shower regular. No creams, ointments or bandages on incision. return to ER or call doctor if headache not relieved with Tylenol, changes in vision, chest pain or shortness of breath. f/u with doctor as instructed. return to ER or call doctor if headache not relieved with Tylenol, changes in vision, chest pain or shortness of breath. f/u with doctor as instructed.  follup up next week for BP check and continue labetalol.

## 2019-12-13 NOTE — DISCHARGE NOTE OB - CARE PLAN
Principal Discharge DX:	 delivery delivered  Goal:	routine postop/postpartum care.  Assessment and plan of treatment:	routine postop/postpartum care. no heavy lifting >10 lbs and nothing in vagina x 6 weeks. Contact doctor if heavy bleeding (soaking > 1 pad/hr x 2 hrs), increased pain, inability to hold down food or drink, abn purulent discharge from wound or opening of wound. you are able to shower regular. No creams, ointments or bandages on incision.  Secondary Diagnosis:	Severe pre-eclampsia in third trimester  Assessment and plan of treatment:	return to ER or call doctor if headache not relieved with Tylenol, changes in vision, chest pain or shortness of breath. f/u with doctor as instructed.  Secondary Diagnosis:	Placental abruption in third trimester Principal Discharge DX:	 delivery delivered  Goal:	routine postop/postpartum care.  Assessment and plan of treatment:	routine postop/postpartum care. no heavy lifting >10 lbs and nothing in vagina x 6 weeks. Contact doctor if heavy bleeding (soaking > 1 pad/hr x 2 hrs), increased pain, inability to hold down food or drink, abn purulent discharge from wound or opening of wound. you are able to shower regular. No creams, ointments or bandages on incision.  Secondary Diagnosis:	Severe pre-eclampsia in third trimester  Assessment and plan of treatment:	return to ER or call doctor if headache not relieved with Tylenol, changes in vision, chest pain or shortness of breath. f/u with doctor as instructed.  follup up next week for BP check and continue labetalol.  Secondary Diagnosis:	Placental abruption in third trimester

## 2019-12-13 NOTE — DISCHARGE NOTE OB - MEDICATION SUMMARY - MEDICATIONS TO TAKE
I will START or STAY ON the medications listed below when I get home from the hospital:    acetaminophen 500 mg oral tablet  -- 2 tab(s) by mouth every 6 hours  -- Indication: For pain    ibuprofen 600 mg oral tablet  -- 1 tab(s) by mouth every 6 hours  -- Indication: For pain    oxyCODONE 5 mg oral capsule  -- 1 cap(s) by mouth every 4 hours, As Needed -for severe pain MDD:6 tabs  -- Caution federal law prohibits the transfer of this drug to any person other  than the person for whom it was prescribed.  It is very important that you take or use this exactly as directed.  Do not skip doses or discontinue unless directed by your doctor.  May cause drowsiness.  Alcohol may intensify this effect.  Use care when operating dangerous machinery.  This prescription cannot be refilled.  Using more of this medication than prescribed may cause serious breathing problems.    -- Indication: For pain    labetalol 300 mg oral tablet  -- 1 tab(s) by mouth 3 times a day MDD:900mg  -- It is very important that you take or use this exactly as directed.  Do not skip doses or discontinue unless directed by your doctor.  May cause drowsiness.  Alcohol may intensify this effect.  Use care when operating dangerous machinery.  Some non-prescription drugs may aggravate your condition.  Read all labels carefully.  If a warning appears, check with your doctor before taking.    -- Indication: For preeclampsia

## 2019-12-13 NOTE — DISCHARGE NOTE OB - CARE PROVIDERS DIRECT ADDRESSES
[email or web address]

## 2019-12-13 NOTE — DISCHARGE NOTE OB - HOSPITAL COURSE
Patient S/P urgent  section for severe preeclampsia and placental abruption. uncomplicated postpartum course Patient S/P urgent   section for severe preeclampsia and placental abruption. postpartum hyponatremia- resolved after IV hydration. Otherwise, uncomplicated postpartum course Patient S/P urgent   section for severe preeclampsia and placental abruption. postpartum hyponatremia- resolved after IV hydration. Otherwise, uncomplicated postpartum course and meeting all milestone.   Discharged home on over the counter tylenol and ibuprofen, and labetalol for HTN.

## 2019-12-13 NOTE — PROGRESS NOTE ADULT - PROBLEM SELECTOR PLAN 2
- s/p Mg infusion  - BPs at goal overnight  - continue labetalol 300mg po tid  - f/u AM HEL labs  - continue VS q4h

## 2019-12-13 NOTE — PROGRESS NOTE ADULT - SUBJECTIVE AND OBJECTIVE BOX
R2 OB Postpartum Progress Note    HD#4, POD#3    SUBJECTIVE:  Pt seen and examined at bedside.  Pt reports pain is well controlled.  Denies headache.  She denies visual changes, sob, cp, ruq/epigastric pain.  She denies fevers, chills, dizziness.  She is ambulating, voiding, and tolerating po.      OBJECTIVE:    Vital Signs Last 24 Hours  T(C): 36.9 (12-12-19 @ 00:55), Max: 37.2 (12-11-19 @ 14:38)  HR: 88 (12-12-19 @ 00:55) (81 - 90)  BP: 104/68 (12-12-19 @ 00:55) (100/66 - 128/81)  RR: 18 (12-12-19 @ 00:55) (18 - 18)  SpO2: 97% (12-12-19 @ 00:55) (96% - 98%)    I&O's Summary    10 Dec 2019 07:01  -  11 Dec 2019 07:00  --------------------------------------------------------  IN: 500 mL / OUT: 4950 mL / NET: -4450 mL    11 Dec 2019 07:01  -  12 Dec 2019 04:25  --------------------------------------------------------  IN: 0 mL / OUT: 800 mL / NET: -800 mL    GEN: nad, a&ox3  CV: rrr  PULM: ctab  ABD: softly distended, appropriately tender  INCISION: c/d/i  EXT: wwp, nt      Labs:                        8.9    17.65 )-----------( 128      ( 11 Dec 2019 07:21 )             26.6   baso 0.2    eos 0.1    imm gran 1.0    lymph 14.6   mono 4.5    poly 79.6                         9.4    22.11 )-----------( 123      ( 10 Dec 2019 22:36 )             27.8   baso 0.2    eos 0.0    imm gran 1.1    lymph 10.0   mono 4.9    poly 83.8                         10.6   22.50 )-----------( 139      ( 10 Dec 2019 16:25 )             31.8   baso 0.1    eos 0.0    imm gran 1.2    lymph 8.5    mono 5.3    poly 84.9                         10.2   18.87 )-----------( 131      ( 10 Dec 2019 10:39 )             30.5   baso 0.1    eos 0.0    imm gran 1.5    lymph 7.2    mono 2.4    poly 88.8                         10.8   14.19 )-----------( 125      ( 10 Dec 2019 03:45 )             32.8   baso 0.3    eos 0.2    imm gran 1.8    lymph 13.9   mono 3.0    poly 80.8                         11.6   12.83 )-----------( 144      ( 10 Dec 2019 00:39 )             35.3   baso 0.2    eos 0.5    imm gran 0.9    lymph 26.3   mono 6.2    poly 65.9     12-11    128<L>  |  94<L>  |  9   ----------------------------<  118<H>  4.0   |  22  |  0.57    Ca    7.0<L>      11 Dec 2019 07:21  Mg     5.9     12-10    TPro  5.2<L>  /  Alb  2.6<L>  /  TBili  <0.1<L>  /  DBili  x   /  AST  29  /  ALT  13  /  AlkPhos  158<H>  12-11    PT/INR - ( 11 Dec 2019 07:21 )   PT: 9.3 sec;   INR: 0.81 ratio         PTT - ( 11 Dec 2019 07:21 )  PTT:26.0 sec      Blood Type: A Positive      MEDICATIONS  (STANDING):  acetaminophen   Tablet .. 975 milliGRAM(s) Oral every 6 hours  acetaminophen  IVPB .. 1000 milliGRAM(s) IV Intermittent every 6 hours  ascorbic acid 500 milliGRAM(s) Oral daily  diphtheria/tetanus/pertussis (acellular) Vaccine (ADAcel) 0.5 milliLiter(s) IntraMuscular once  ferrous    sulfate 325 milliGRAM(s) Oral daily  heparin  Injectable 5000 Unit(s) SubCutaneous every 12 hours  ibuprofen  Tablet. 600 milliGRAM(s) Oral every 6 hours  labetalol 300 milliGRAM(s) Oral three times a day  magnesium sulfate Infusion 2 Gm/Hr (50 mL/Hr) IV Continuous <Continuous>  prenatal multivitamin 1 Tablet(s) Oral daily  sodium chloride 0.9%. 1000 milliLiter(s) (30 mL/Hr) IV Continuous <Continuous>    MEDICATIONS  (PRN):  diphenhydrAMINE 25 milliGRAM(s) Oral every 6 hours PRN Itching  glycerin Suppository - Adult 1 Suppository(s) Rectal at bedtime PRN Constipation  lanolin Ointment 1 Application(s) Topical every 6 hours PRN Sore Nipples  magnesium hydroxide Suspension 30 milliLiter(s) Oral two times a day PRN Constipation  oxyCODONE    IR 5 milliGRAM(s) Oral every 3 hours PRN Moderate to Severe Pain (4-10)  oxyCODONE    IR 5 milliGRAM(s) Oral once PRN Moderate to Severe Pain (4-10)  simethicone 80 milliGRAM(s) Chew every 4 hours PRN Gas

## 2019-12-14 VITALS
TEMPERATURE: 98 F | RESPIRATION RATE: 16 BRPM | HEART RATE: 84 BPM | DIASTOLIC BLOOD PRESSURE: 78 MMHG | SYSTOLIC BLOOD PRESSURE: 128 MMHG

## 2019-12-14 PROCEDURE — 86850 RBC ANTIBODY SCREEN: CPT

## 2019-12-14 PROCEDURE — 85460 HEMOGLOBIN FETAL: CPT

## 2019-12-14 PROCEDURE — C1765: CPT

## 2019-12-14 PROCEDURE — 85027 COMPLETE CBC AUTOMATED: CPT

## 2019-12-14 PROCEDURE — 84550 ASSAY OF BLOOD/URIC ACID: CPT

## 2019-12-14 PROCEDURE — 82570 ASSAY OF URINE CREATININE: CPT

## 2019-12-14 PROCEDURE — 83615 LACTATE (LD) (LDH) ENZYME: CPT

## 2019-12-14 PROCEDURE — 86900 BLOOD TYPING SEROLOGIC ABO: CPT

## 2019-12-14 PROCEDURE — 80053 COMPREHEN METABOLIC PANEL: CPT

## 2019-12-14 PROCEDURE — 86780 TREPONEMA PALLIDUM: CPT

## 2019-12-14 PROCEDURE — 86901 BLOOD TYPING SEROLOGIC RH(D): CPT

## 2019-12-14 PROCEDURE — 81001 URINALYSIS AUTO W/SCOPE: CPT

## 2019-12-14 PROCEDURE — 59025 FETAL NON-STRESS TEST: CPT

## 2019-12-14 PROCEDURE — 88307 TISSUE EXAM BY PATHOLOGIST: CPT

## 2019-12-14 PROCEDURE — 85384 FIBRINOGEN ACTIVITY: CPT

## 2019-12-14 PROCEDURE — 85730 THROMBOPLASTIN TIME PARTIAL: CPT

## 2019-12-14 PROCEDURE — 85610 PROTHROMBIN TIME: CPT

## 2019-12-14 PROCEDURE — 83735 ASSAY OF MAGNESIUM: CPT

## 2019-12-14 PROCEDURE — G0463: CPT

## 2019-12-14 PROCEDURE — 59050 FETAL MONITOR W/REPORT: CPT

## 2019-12-14 PROCEDURE — 84156 ASSAY OF PROTEIN URINE: CPT

## 2019-12-14 RX ORDER — IBUPROFEN 200 MG
3 TABLET ORAL
Qty: 0 | Refills: 0 | DISCHARGE
Start: 2019-12-14

## 2019-12-14 RX ORDER — OXYCODONE HYDROCHLORIDE 5 MG/1
1 TABLET ORAL
Qty: 10 | Refills: 0
Start: 2019-12-14

## 2019-12-14 RX ORDER — LABETALOL HCL 100 MG
1 TABLET ORAL
Qty: 90 | Refills: 0
Start: 2019-12-14 | End: 2020-01-12

## 2019-12-14 RX ORDER — IBUPROFEN 200 MG
1 TABLET ORAL
Qty: 0 | Refills: 0 | DISCHARGE
Start: 2019-12-14

## 2019-12-14 RX ORDER — ACETAMINOPHEN 500 MG
2 TABLET ORAL
Qty: 0 | Refills: 0 | DISCHARGE
Start: 2019-12-14

## 2019-12-14 RX ADMIN — HEPARIN SODIUM 5000 UNIT(S): 5000 INJECTION INTRAVENOUS; SUBCUTANEOUS at 12:47

## 2019-12-14 RX ADMIN — Medication 300 MILLIGRAM(S): at 08:16

## 2019-12-14 RX ADMIN — Medication 975 MILLIGRAM(S): at 10:30

## 2019-12-14 RX ADMIN — Medication 975 MILLIGRAM(S): at 17:20

## 2019-12-14 RX ADMIN — Medication 975 MILLIGRAM(S): at 16:36

## 2019-12-14 RX ADMIN — Medication 300 MILLIGRAM(S): at 16:35

## 2019-12-14 RX ADMIN — Medication 300 MILLIGRAM(S): at 01:05

## 2019-12-14 RX ADMIN — Medication 600 MILLIGRAM(S): at 19:01

## 2019-12-14 RX ADMIN — Medication 600 MILLIGRAM(S): at 06:47

## 2019-12-14 RX ADMIN — Medication 975 MILLIGRAM(S): at 03:53

## 2019-12-14 RX ADMIN — Medication 325 MILLIGRAM(S): at 12:49

## 2019-12-14 RX ADMIN — Medication 975 MILLIGRAM(S): at 04:30

## 2019-12-14 RX ADMIN — Medication 600 MILLIGRAM(S): at 00:00

## 2019-12-14 RX ADMIN — Medication 975 MILLIGRAM(S): at 09:49

## 2019-12-14 RX ADMIN — Medication 500 MILLIGRAM(S): at 12:49

## 2019-12-14 RX ADMIN — Medication 1 TABLET(S): at 12:55

## 2019-12-14 RX ADMIN — Medication 600 MILLIGRAM(S): at 12:49

## 2019-12-14 RX ADMIN — Medication 600 MILLIGRAM(S): at 13:30

## 2019-12-14 NOTE — PROGRESS NOTE ADULT - SUBJECTIVE AND OBJECTIVE BOX
R3 OB Postpartum Progress Note    HD#5, POD#4    SUBJECTIVE:  Pt seen and examined at bedside.  Pt reports pain is well controlled.  She denies HA,   visual changes, sob, cp, ruq/epigastric pain.  She denies fevers, chills, dizziness.    She is ambulating, voiding, and tolerating po.      OBJECTIVE:  Vital Signs Last 24 Hours  T(C): 36.8 (12-14-19 @ 00:45), Max: 37.1 (12-13-19 @ 08:38)  HR: 79 (12-14-19 @ 00:45) (73 - 89)  BP: 129/86 (12-14-19 @ 00:45) (109/71 - 129/86)  RR: 18 (12-14-19 @ 00:45) (18 - 18)  SpO2: 98% (12-14-19 @ 00:45) (97% - 99%)      GEN: nad, a&ox3  CV: rrr  PULM: ctab  ABD: softly distended, appropriately tender  INCISION: c/d/i  EXT: wwp, nt        Labs:                        8.8    13.18 )-----------( 157      ( 13 Dec 2019 06:42 )             27.0   baso 0.9    eos 0.9    imm gran x      lymph 13.9   mono 0.0    poly 73.0                         8.7    15.23 )-----------( 141      ( 12 Dec 2019 06:51 )             26.7   baso 0.3    eos 0.5    imm gran 1.5    lymph 23.5   mono 7.0    poly 67.2                         8.9    17.65 )-----------( 128      ( 11 Dec 2019 07:21 )             26.6   baso 0.2    eos 0.1    imm gran 1.0    lymph 14.6   mono 4.5    poly 79.6     12-13    138  |  106  |  15  ----------------------------<  75  4.2   |  22  |  0.53    Ca    8.8      13 Dec 2019 06:36    TPro  5.5<L>  /  Alb  2.8<L>  /  TBili  <0.1<L>  /  DBili  x   /  AST  32  /  ALT  21  /  AlkPhos  131<H>  12-13    PT/INR - ( 13 Dec 2019 06:42 )   PT: 10.0 sec;   INR: 0.87 ratio         PTT - ( 13 Dec 2019 06:42 )  PTT:33.7 sec      Blood Type: A Positive      MEDICATIONS  (STANDING):  acetaminophen   Tablet .. 975 milliGRAM(s) Oral every 6 hours  ascorbic acid 500 milliGRAM(s) Oral daily  diphtheria/tetanus/pertussis (acellular) Vaccine (ADAcel) 0.5 milliLiter(s) IntraMuscular once  ferrous    sulfate 325 milliGRAM(s) Oral daily  heparin  Injectable 5000 Unit(s) SubCutaneous every 12 hours  ibuprofen  Tablet. 600 milliGRAM(s) Oral every 6 hours  labetalol 300 milliGRAM(s) Oral three times a day  magnesium sulfate Infusion 2 Gm/Hr (50 mL/Hr) IV Continuous <Continuous>  prenatal multivitamin 1 Tablet(s) Oral daily  sodium chloride 0.9%. 1000 milliLiter(s) (30 mL/Hr) IV Continuous <Continuous>    MEDICATIONS  (PRN):  diphenhydrAMINE 25 milliGRAM(s) Oral every 6 hours PRN Itching  glycerin Suppository - Adult 1 Suppository(s) Rectal at bedtime PRN Constipation  lanolin Ointment 1 Application(s) Topical every 6 hours PRN Sore Nipples  magnesium hydroxide Suspension 30 milliLiter(s) Oral two times a day PRN Constipation  oxyCODONE    IR 5 milliGRAM(s) Oral every 3 hours PRN Moderate to Severe Pain (4-10)  oxyCODONE    IR 5 milliGRAM(s) Oral once PRN Moderate to Severe Pain (4-10)  simethicone 80 milliGRAM(s) Chew every 4 hours PRN Gas

## 2019-12-14 NOTE — PROGRESS NOTE ADULT - ATTENDING COMMENTS
OB attending Note    Agree with above. 29y  now POD3 s/p pLTCS at 34+ weeks for suspected clinical abruption and ruled in for severe PEC. Doing well, no HA/RUQ pain/vision changes. Minimal lochia. Tolerating PO no n/v, ambulating and passing flatus. Pumping without issue.    PE  ICU Vital Signs Last 24 Hrs  T(C): 37.1 (13 Dec 2019 08:38), Max: 37.1 (13 Dec 2019 01:05)  T(F): 98.8 (13 Dec 2019 08:38), Max: 98.8 (13 Dec 2019 08:38)  HR: 73 (13 Dec 2019 08:38) (73 - 92)  BP: 129/74 (13 Dec 2019 08:38) (115/68 - 129/74)  BP(mean): --  ABP: --  ABP(mean): --  RR: 18 (13 Dec 2019 08:38) (18 - 18)  SpO2: 98% (13 Dec 2019 08:38) (97% - 99%)    Gen: nad  Abd: soft NT, incision c/d/i, steris in place    LABS                        8.8    13. )-----------( 157      ( 13 Dec 2019 06:42 )             27.0     12-    138  |  106  |  15  ----------------------------<  75  4.2   |  22  |  0.53    Ca    8.8      13 Dec 2019 06:36    TPro  5.5<L>  /  Alb  2.8<L>  /  TBili  <0.1<L>  /  DBili  x   /  AST  32  /  ALT  21  /  AlkPhos  131<H>  12-13    Rh pos, RPR NR, rubella immune, measles imm    Plan:   - CBC reviewed, BPs reviewed  - S/p 24hr Mg postpartum, continue labetalol 300mg TID, BPs so far well controlled  - anticipate DC on POD4    Sera Johnson MD
Patient doing well, no complaints, out of bed.   No HA, CP, SOB  No heavy vaginal bleeding (VB)/normal lochia  pain controled    ICU Vital Signs Last 24 Hrs  T(C): 36.7 (14 Dec 2019 09:42), Max: 37.1 (13 Dec 2019 13:20)  HR: 77 (14 Dec 2019 09:42) (67 - 89)  BP: 123/81 (14 Dec 2019 09:42) (109/71 - 129/86)  RR: 18 (14 Dec 2019 09:42) (16 - 18)  SpO2: 97% (14 Dec 2019 09:42) (97% - 99%)                        8.8    13.18 )-----------( 157      ( 13 Dec 2019 06:42 )             27.0     POD # 4 stable for discharge after C/S found to have abruption and preeclampsia  continue labetolol  pain control as needed, declines narcotica  follow up  next week for BP check  continue Fe supplementation--stable anemia 2nd to blood loss at delivery       Patient seen and evaluated by me. I agree with resident note unless otherwise stated.   Routine postpartum care, regular diet as tolerated, ambulate and pain control as needed.     DEVAUGHN Raman MD  Attending
Patient doing well   No headache or blurry vision  Pressure is normal 113/69  Continue post partum management.
Pt seen and evaluated  BP's 100-129/66-84 on Labetalol 300q8  Stable POD#1 for severe PEC/Abruption at 34 wks GA  Routine postop care  Encourage ambulation  Check labs

## 2019-12-14 NOTE — PROGRESS NOTE ADULT - ASSESSMENT
29y  now POD4 s/p pLTCS (EBL 1000) at 34w0d for abrution, c/b sPEC. Pt had hyponatremia/hypochloremia on POD1 which is resolved.  Pt is stable and afebrile.

## 2019-12-16 ENCOUNTER — RESULT CHARGE (OUTPATIENT)
Age: 29
End: 2019-12-16

## 2019-12-16 NOTE — DISCUSSION/SUMMARY
[FreeTextEntry1] : Patient admitted to [facility] on 12/13. Pt gave birth to baby girl 6 weeks early. Mother d/c homw on 12/15 and doing well.  Baby in [place] at present. Plan for d/c Violeta Pelaez if all goes well. WIll follow up with pmd next year. Will call for an appt

## 2019-12-17 ENCOUNTER — APPOINTMENT (OUTPATIENT)
Dept: OBGYN | Facility: CLINIC | Age: 29
End: 2019-12-17

## 2019-12-17 ENCOUNTER — OTHER (OUTPATIENT)
Age: 29
End: 2019-12-17

## 2019-12-18 LAB
BILIRUB UR QL STRIP: NEGATIVE
CLARITY UR: CLEAR
COLLECTION METHOD: NORMAL
GLUCOSE UR-MCNC: NEGATIVE
HGB UR QL STRIP.AUTO: NORMAL
KETONES UR-MCNC: NEGATIVE
LEUKOCYTE ESTERASE UR QL STRIP: NEGATIVE
NITRITE UR QL STRIP: NEGATIVE
PROT UR STRIP-MCNC: 30

## 2019-12-20 LAB — SURGICAL PATHOLOGY STUDY: SIGNIFICANT CHANGE UP

## 2019-12-26 ENCOUNTER — APPOINTMENT (OUTPATIENT)
Dept: OBGYN | Facility: CLINIC | Age: 29
End: 2019-12-26
Payer: COMMERCIAL

## 2019-12-26 VITALS
HEIGHT: 66 IN | WEIGHT: 143 LBS | DIASTOLIC BLOOD PRESSURE: 71 MMHG | SYSTOLIC BLOOD PRESSURE: 108 MMHG | BODY MASS INDEX: 22.98 KG/M2

## 2019-12-26 PROCEDURE — 0503F POSTPARTUM CARE VISIT: CPT

## 2019-12-26 NOTE — HISTORY OF PRESENT ILLNESS
[Postpartum Follow Up] : postpartum follow up [Delivery Date: ___] : on [unfilled] [Complications:___] : complications include: [unfilled] [NICU: ___] : NICU: [unfilled] [Wt. ___] : weighing [unfilled] [Discharge HGB: ___] : hemoglobin level was [unfilled] [Female] : Delivery History: baby girl [Resumed [place]] : has not resumed intercourse [Resumed Menses] : has not resumed her menses [Intended Contraception] : Intended Contraception: [None] : The patient is currently asymptomatic [Clean/Dry/Intact] : clean, dry and intact [Swelling] : not swollen [Dehiscence] : not dehisced [Erythema] : not erythematous [de-identified] : breast pumping [Doing Well] : is doing well [de-identified] : baby girl Donia, c/b abruption and PEC w SF [de-identified] : mikeis removed

## 2020-01-31 ENCOUNTER — APPOINTMENT (OUTPATIENT)
Dept: OBGYN | Facility: CLINIC | Age: 30
End: 2020-01-31
Payer: COMMERCIAL

## 2020-01-31 ENCOUNTER — LABORATORY RESULT (OUTPATIENT)
Age: 30
End: 2020-01-31

## 2020-01-31 VITALS
BODY MASS INDEX: 23.3 KG/M2 | WEIGHT: 145 LBS | HEIGHT: 66 IN | SYSTOLIC BLOOD PRESSURE: 112 MMHG | DIASTOLIC BLOOD PRESSURE: 72 MMHG

## 2020-01-31 LAB — LUPUS ANTICOAGULANT CASCADE REFLEX: NORMAL

## 2020-01-31 PROCEDURE — 0503F POSTPARTUM CARE VISIT: CPT

## 2020-01-31 NOTE — HISTORY OF PRESENT ILLNESS
[Postpartum Follow Up] : postpartum follow up [Complications:___] : complications include: [unfilled] [Last Pap Date: ___] : Last Pap Date: [unfilled] [Delivery Date: ___] : on [unfilled] [Female] : Delivery History: baby girl [Wt. ___] : weighing [unfilled] [NICU: ___] : NICU: [unfilled] [Discharge HGB: ___] : hemoglobin level was [unfilled] [Intended Contraception] : Intended Contraception: [Clean/Dry/Intact] : clean, dry and intact [Doing Well] : is doing well [Resumed [place]] : has not resumed intercourse [Resumed Menses] : has not resumed her menses [Erythema] : not erythematous [Swelling] : not swollen [Dehiscence] : not dehisced [Back to Normal] : is back to normal in size [Healed] : healed [Normal] : the vagina was normal [None] : no vaginal bleeding [Examination Of The Breasts] : breasts are normal [de-identified] : baby girl Donia, c/b abruption and PEC w SF [de-identified] : breast pumping

## 2020-02-05 LAB
B2 GLYCOPROT1 IGG SER-ACNC: <5 SGU
B2 GLYCOPROT1 IGM SER-ACNC: <5 SMU
CARDIOLIPIN IGM SER-MCNC: 8.2 MPL
CARDIOLIPIN IGM SER-MCNC: <5 GPL

## 2020-06-18 ENCOUNTER — APPOINTMENT (OUTPATIENT)
Dept: OBGYN | Facility: CLINIC | Age: 30
End: 2020-06-18
Payer: COMMERCIAL

## 2020-06-18 VITALS
HEIGHT: 66 IN | WEIGHT: 146 LBS | SYSTOLIC BLOOD PRESSURE: 123 MMHG | DIASTOLIC BLOOD PRESSURE: 68 MMHG | BODY MASS INDEX: 23.46 KG/M2

## 2020-06-18 DIAGNOSIS — O09.299 SUPERVISION OF PREGNANCY WITH OTHER POOR REPRODUCTIVE OR OBSTETRIC HISTORY, UNSPECIFIED TRIMESTER: ICD-10-CM

## 2020-06-18 DIAGNOSIS — O45.93 PREMATURE SEPARATION OF PLACENTA, UNSPECIFIED, THIRD TRIMESTER: ICD-10-CM

## 2020-06-18 DIAGNOSIS — Z32.01 ENCOUNTER FOR PREGNANCY TEST, RESULT POSITIVE: ICD-10-CM

## 2020-06-18 DIAGNOSIS — Z98.891 HISTORY OF UTERINE SCAR FROM PREVIOUS SURGERY: ICD-10-CM

## 2020-06-18 PROCEDURE — 76817 TRANSVAGINAL US OBSTETRIC: CPT

## 2020-06-18 PROCEDURE — 99213 OFFICE O/P EST LOW 20 MIN: CPT | Mod: 25

## 2020-06-30 ENCOUNTER — APPOINTMENT (OUTPATIENT)
Dept: INTERNAL MEDICINE | Facility: CLINIC | Age: 30
End: 2020-06-30
Payer: COMMERCIAL

## 2020-06-30 VITALS
HEIGHT: 66 IN | OXYGEN SATURATION: 100 % | WEIGHT: 147 LBS | TEMPERATURE: 98.5 F | DIASTOLIC BLOOD PRESSURE: 70 MMHG | HEART RATE: 84 BPM | SYSTOLIC BLOOD PRESSURE: 115 MMHG | BODY MASS INDEX: 23.63 KG/M2

## 2020-06-30 DIAGNOSIS — Z11.59 ENCOUNTER FOR SCREENING FOR OTHER VIRAL DISEASES: ICD-10-CM

## 2020-06-30 DIAGNOSIS — Z32.01 ENCOUNTER FOR PREGNANCY TEST, RESULT POSITIVE: ICD-10-CM

## 2020-06-30 DIAGNOSIS — O14.93 UNSPECIFIED PRE-ECLAMPSIA, THIRD TRIMESTER: ICD-10-CM

## 2020-06-30 DIAGNOSIS — Z00.00 ENCOUNTER FOR GENERAL ADULT MEDICAL EXAMINATION W/OUT ABNORMAL FINDINGS: ICD-10-CM

## 2020-06-30 PROCEDURE — G0444 DEPRESSION SCREEN ANNUAL: CPT

## 2020-06-30 PROCEDURE — 36415 COLL VENOUS BLD VENIPUNCTURE: CPT

## 2020-06-30 PROCEDURE — 99395 PREV VISIT EST AGE 18-39: CPT | Mod: 25

## 2020-06-30 NOTE — ASSESSMENT
[FreeTextEntry1] : discussed w pt \par \par reviewed her obstetrical history recently. feeling well currently , her daughter is doing well. she is now  9 weeks gestation. following w Dr Yosi JETER and will be seeing MFM as well. \par \par cont prenatal vitamins, mild routine exercise advised \par \par UTD w Tdap vaccine \par \par routine labs as below, check COVID19 ab \par \par repeat BP checked and remains quite normal , she will be monitored for recurrence of preeclampsia during this pregnancy \par \par RTO yearly for routine exam or earlier prn if any new concerns

## 2020-06-30 NOTE — HISTORY OF PRESENT ILLNESS
[de-identified] : 28 y/o woman presents for annual physical exam. she feels well currently. she is currently 9 weeks pregnant w her second child. her recent pregnancy culminated in complicated delivery in 12/20 w ruptured placenta. she was monitored closely, also developed preeclampsia briefly , treated w labetalol for short period. now doing well. \par she will be following w Dr Deluca GYN and w MFM. \par \par no concerns at this time, no infections during COVID19 pandemic \par taking prenatal vitamins

## 2020-06-30 NOTE — HEALTH RISK ASSESSMENT
[No] : In the past 12 months have you used drugs other than those required for medical reasons? No [No falls in past year] : Patient reported no falls in the past year [None] : None [Student] : student [] :  [] : No

## 2020-06-30 NOTE — PHYSICAL EXAM
[No Acute Distress] : no acute distress [Well Developed] : well developed [Well Nourished] : well nourished [Well-Appearing] : well-appearing [Normal Sclera/Conjunctiva] : normal sclera/conjunctiva [Normal Voice/Communication] : normal voice/communication [Normal Outer Ear/Nose] : the outer ears and nose were normal in appearance [PERRL] : pupils equal round and reactive to light [Normal Oropharynx] : the oropharynx was normal [Normal TMs] : both tympanic membranes were normal [No Lymphadenopathy] : no lymphadenopathy [No JVD] : no jugular venous distention [Supple] : supple [Thyroid Normal, No Nodules] : the thyroid was normal and there were no nodules present [No Accessory Muscle Use] : no accessory muscle use [No Respiratory Distress] : no respiratory distress  [Normal Rate] : normal rate  [Clear to Auscultation] : lungs were clear to auscultation bilaterally [No Murmur] : no murmur heard [Normal S1, S2] : normal S1 and S2 [Regular Rhythm] : with a regular rhythm [No Carotid Bruits] : no carotid bruits [No Varicosities] : no varicosities [No Abdominal Bruit] : a ~M bruit was not heard ~T in the abdomen [No Edema] : there was no peripheral edema [No Palpable Aorta] : no palpable aorta [Pedal Pulses Present] : the pedal pulses are present [No Extremity Clubbing/Cyanosis] : no extremity clubbing/cyanosis [Soft] : abdomen soft [Non Tender] : non-tender [Non-distended] : non-distended [No Masses] : no abdominal mass palpated [Normal Bowel Sounds] : normal bowel sounds [No HSM] : no HSM [Normal Supraclavicular Nodes] : no supraclavicular lymphadenopathy [Normal Posterior Cervical Nodes] : no posterior cervical lymphadenopathy [Normal Anterior Cervical Nodes] : no anterior cervical lymphadenopathy [No CVA Tenderness] : no CVA  tenderness [No Spinal Tenderness] : no spinal tenderness [No Joint Swelling] : no joint swelling [No Rash] : no rash [Grossly Normal Strength/Tone] : grossly normal strength/tone [Normal Gait] : normal gait [No Focal Deficits] : no focal deficits [Coordination Grossly Intact] : coordination grossly intact [Alert and Oriented x3] : oriented to person, place, and time [Normal Affect] : the affect was normal [Normal Insight/Judgement] : insight and judgment were intact [Normal Mood] : the mood was normal

## 2020-07-06 ENCOUNTER — APPOINTMENT (OUTPATIENT)
Dept: OBGYN | Facility: CLINIC | Age: 30
End: 2020-07-06
Payer: COMMERCIAL

## 2020-07-06 ENCOUNTER — ASOB RESULT (OUTPATIENT)
Age: 30
End: 2020-07-06

## 2020-07-06 ENCOUNTER — NON-APPOINTMENT (OUTPATIENT)
Age: 30
End: 2020-07-06

## 2020-07-06 ENCOUNTER — APPOINTMENT (OUTPATIENT)
Dept: ANTEPARTUM | Facility: CLINIC | Age: 30
End: 2020-07-06
Payer: COMMERCIAL

## 2020-07-06 VITALS
WEIGHT: 147 LBS | DIASTOLIC BLOOD PRESSURE: 67 MMHG | HEIGHT: 66 IN | SYSTOLIC BLOOD PRESSURE: 115 MMHG | BODY MASS INDEX: 23.63 KG/M2

## 2020-07-06 DIAGNOSIS — Z34.91 ENCOUNTER FOR SUPERVISION OF NORMAL PREGNANCY, UNSPECIFIED, FIRST TRIMESTER: ICD-10-CM

## 2020-07-06 PROCEDURE — 93976 VASCULAR STUDY: CPT

## 2020-07-06 PROCEDURE — 0501F PRENATAL FLOW SHEET: CPT

## 2020-07-06 PROCEDURE — 36416 COLLJ CAPILLARY BLOOD SPEC: CPT

## 2020-07-06 PROCEDURE — 76801 OB US < 14 WKS SINGLE FETUS: CPT

## 2020-07-06 PROCEDURE — 76813 OB US NUCHAL MEAS 1 GEST: CPT

## 2020-07-07 ENCOUNTER — NON-APPOINTMENT (OUTPATIENT)
Age: 30
End: 2020-07-07

## 2020-07-07 LAB
ALBUMIN SERPL ELPH-MCNC: 4.6 G/DL
ALP BLD-CCNC: 42 U/L
ALT SERPL-CCNC: 13 U/L
ANION GAP SERPL CALC-SCNC: 14 MMOL/L
APPEARANCE: CLEAR
AST SERPL-CCNC: 15 U/L
BASOPHILS # BLD AUTO: 0.03 K/UL
BASOPHILS NFR BLD AUTO: 0.4 %
BILIRUB SERPL-MCNC: <0.2 MG/DL
BILIRUBIN URINE: NEGATIVE
BLOOD URINE: NEGATIVE
BUN SERPL-MCNC: 8 MG/DL
CALCIUM SERPL-MCNC: 9.7 MG/DL
CHLORIDE SERPL-SCNC: 101 MMOL/L
CHOLEST SERPL-MCNC: 204 MG/DL
CHOLEST/HDLC SERPL: 2.2 RATIO
CO2 SERPL-SCNC: 22 MMOL/L
COLOR: COLORLESS
CREAT SERPL-MCNC: 0.47 MG/DL
EOSINOPHIL # BLD AUTO: 0.07 K/UL
EOSINOPHIL NFR BLD AUTO: 0.9 %
ESTIMATED AVERAGE GLUCOSE: 103 MG/DL
GLUCOSE QUALITATIVE U: NEGATIVE
GLUCOSE SERPL-MCNC: 83 MG/DL
HBA1C MFR BLD HPLC: 5.2 %
HCT VFR BLD CALC: 34.7 %
HDLC SERPL-MCNC: 94 MG/DL
HGB BLD-MCNC: 11.4 G/DL
IMM GRANULOCYTES NFR BLD AUTO: 0.3 %
KETONES URINE: NORMAL
LDLC SERPL CALC-MCNC: 98 MG/DL
LEUKOCYTE ESTERASE URINE: NEGATIVE
LYMPHOCYTES # BLD AUTO: 1.86 K/UL
LYMPHOCYTES NFR BLD AUTO: 24.7 %
MAN DIFF?: NORMAL
MCHC RBC-ENTMCNC: 31.5 PG
MCHC RBC-ENTMCNC: 32.9 GM/DL
MCV RBC AUTO: 95.9 FL
MONOCYTES # BLD AUTO: 0.52 K/UL
MONOCYTES NFR BLD AUTO: 6.9 %
NEUTROPHILS # BLD AUTO: 5.03 K/UL
NEUTROPHILS NFR BLD AUTO: 66.8 %
NITRITE URINE: NEGATIVE
PH URINE: 6.5
PLATELET # BLD AUTO: 247 K/UL
POTASSIUM SERPL-SCNC: 4.6 MMOL/L
PROT SERPL-MCNC: 6.7 G/DL
PROTEIN URINE: NEGATIVE
RBC # BLD: 3.62 M/UL
RBC # FLD: 13.1 %
SARS-COV-2 IGG SERPL IA-ACNC: <3.8 AU/ML
SARS-COV-2 IGG SERPL QL IA: NEGATIVE
SODIUM SERPL-SCNC: 137 MMOL/L
SPECIFIC GRAVITY URINE: 1.01
T4 FREE SERPL-MCNC: 2 NG/DL
TRIGL SERPL-MCNC: 58 MG/DL
TSH SERPL-ACNC: 0.01 UIU/ML
UROBILINOGEN URINE: NORMAL
WBC # FLD AUTO: 7.53 K/UL

## 2020-07-10 RX ORDER — ASCORBIC ACID, CALCIUM CITRATE, IRON, VITAMIN D, DL- ALPHA- TOCOPHEROL ACETATE, THIAMINE, RIBOFLAVIN, NIACINAMIDE, PYRIDOXINE HYDROCHLORIDE, FOLIC ACID, IODINE, ZINC, COPPER, DOCUSATE SODIUM, DOCONEXENT AND ICOSAPENT
35-1 & 300 KIT DAILY
Qty: 90 | Refills: 3 | Status: ACTIVE | COMMUNITY
Start: 2019-07-26 | End: 1900-01-01

## 2020-07-13 ENCOUNTER — ASOB RESULT (OUTPATIENT)
Age: 30
End: 2020-07-13

## 2020-07-13 ENCOUNTER — APPOINTMENT (OUTPATIENT)
Dept: MATERNAL FETAL MEDICINE | Facility: CLINIC | Age: 30
End: 2020-07-13
Payer: COMMERCIAL

## 2020-07-13 ENCOUNTER — NON-APPOINTMENT (OUTPATIENT)
Age: 30
End: 2020-07-13

## 2020-07-13 LAB
ABO + RH PNL BLD: NORMAL
B19V IGG SER QL IA: 4.4 INDEX
B19V IGG+IGM SER-IMP: NORMAL
B19V IGG+IGM SER-IMP: POSITIVE
B19V IGM FLD-ACNC: 0.1
B19V IGM SER-ACNC: NEGATIVE
BACTERIA UR CULT: NORMAL
BASOPHILS # BLD AUTO: 0.02 K/UL
BASOPHILS NFR BLD AUTO: 0.3 %
BLD GP AB SCN SERPL QL: NORMAL
CLARI ADDITIONAL INFO: NORMAL
CLARI CHROMOSOME 13: NORMAL
CLARI CHROMOSOME 18: NORMAL
CLARI CHROMOSOME 21: NORMAL
CLARI SEX CHROMOSOMES: NORMAL
CLARITEST NIPT: NORMAL
CMV IGG SERPL QL: <0.2 U/ML
CMV IGG SERPL-IMP: NEGATIVE
CMV IGM SERPL QL: <8 AU/ML
CMV IGM SERPL QL: NEGATIVE
EOSINOPHIL # BLD AUTO: 0.08 K/UL
EOSINOPHIL NFR BLD AUTO: 1.1 %
HBV SURFACE AG SER QL: NONREACTIVE
HCT VFR BLD CALC: 34.9 %
HGB BLD-MCNC: 11.1 G/DL
HIV1+2 AB SPEC QL IA.RAPID: NONREACTIVE
IMM GRANULOCYTES NFR BLD AUTO: 0.3 %
LEAD BLD-MCNC: <1 UG/DL
LYMPHOCYTES # BLD AUTO: 1.58 K/UL
LYMPHOCYTES NFR BLD AUTO: 22.3 %
MAN DIFF?: NORMAL
MCHC RBC-ENTMCNC: 31.2 PG
MCHC RBC-ENTMCNC: 31.8 GM/DL
MCV RBC AUTO: 98 FL
MONOCYTES # BLD AUTO: 0.44 K/UL
MONOCYTES NFR BLD AUTO: 6.2 %
NEUTROPHILS # BLD AUTO: 4.95 K/UL
NEUTROPHILS NFR BLD AUTO: 69.8 %
PLATELET # BLD AUTO: 219 K/UL
RBC # BLD: 3.56 M/UL
RBC # FLD: 13.4 %
RUBV IGG FLD-ACNC: 2.2 INDEX
RUBV IGG SER-IMP: POSITIVE
T PALLIDUM AB SER QL IA: NEGATIVE
TSH SERPL-ACNC: 0.01 UIU/ML
VZV AB TITR SER: NEGATIVE
VZV IGG SER IF-ACNC: 97.9 INDEX
WBC # FLD AUTO: 7.09 K/UL

## 2020-07-13 PROCEDURE — 99204 OFFICE O/P NEW MOD 45 MIN: CPT | Mod: 95

## 2020-07-13 RX ORDER — ASCORBIC ACID, CALCIUM CITRATE, IRON, VITAMIN D, DL- ALPHA- TOCOPHEROL ACETATE, THIAMINE, RIBOFLAVIN, NIACINAMIDE, PYRIDOXINE HYDROCHLORIDE, FOLIC ACID, IODINE, ZINC, COPPER, DOCUSATE SODIUM, DOCONEXENT AND ICOSAPENT
35-1 & 300 KIT DAILY
Qty: 90 | Refills: 3 | Status: ACTIVE | COMMUNITY
Start: 2020-07-13 | End: 1900-01-01

## 2020-08-11 ENCOUNTER — APPOINTMENT (OUTPATIENT)
Dept: ANTEPARTUM | Facility: CLINIC | Age: 30
End: 2020-08-11
Payer: COMMERCIAL

## 2020-08-11 ENCOUNTER — NON-APPOINTMENT (OUTPATIENT)
Age: 30
End: 2020-08-11

## 2020-08-11 ENCOUNTER — ASOB RESULT (OUTPATIENT)
Age: 30
End: 2020-08-11

## 2020-08-11 ENCOUNTER — APPOINTMENT (OUTPATIENT)
Dept: OBGYN | Facility: CLINIC | Age: 30
End: 2020-08-11
Payer: COMMERCIAL

## 2020-08-11 VITALS
SYSTOLIC BLOOD PRESSURE: 110 MMHG | BODY MASS INDEX: 23.78 KG/M2 | HEIGHT: 66 IN | WEIGHT: 148 LBS | DIASTOLIC BLOOD PRESSURE: 68 MMHG

## 2020-08-11 DIAGNOSIS — Z34.92 ENCOUNTER FOR SUPERVISION OF NORMAL PREGNANCY, UNSPECIFIED, SECOND TRIMESTER: ICD-10-CM

## 2020-08-11 DIAGNOSIS — O26.899 OTHER SPECIFIED PREGNANCY RELATED CONDITIONS, UNSPECIFIED TRIMESTER: ICD-10-CM

## 2020-08-11 DIAGNOSIS — R51 OTHER SPECIFIED PREGNANCY RELATED CONDITIONS, UNSPECIFIED TRIMESTER: ICD-10-CM

## 2020-08-11 PROCEDURE — 76805 OB US >/= 14 WKS SNGL FETUS: CPT

## 2020-08-11 PROCEDURE — 99213 OFFICE O/P EST LOW 20 MIN: CPT

## 2020-08-22 ENCOUNTER — LABORATORY RESULT (OUTPATIENT)
Age: 30
End: 2020-08-22

## 2020-08-22 ENCOUNTER — NON-APPOINTMENT (OUTPATIENT)
Age: 30
End: 2020-08-22

## 2020-08-22 DIAGNOSIS — Z34.02 ENCOUNTER FOR SUPERVISION OF NORMAL FIRST PREGNANCY, SECOND TRIMESTER: ICD-10-CM

## 2020-08-22 DIAGNOSIS — R79.89 OTHER SPECIFIED ABNORMAL FINDINGS OF BLOOD CHEMISTRY: ICD-10-CM

## 2020-08-22 LAB
1ST TRIMESTER DATA: NORMAL
2ND TRIMESTER DATA: NORMAL
AFP PNL SERPL: NORMAL
AFP SERPL-ACNC: NORMAL
AFP SERPL-ACNC: NORMAL
ALBUMIN SERPL ELPH-MCNC: 4.1 G/DL
ALP BLD-CCNC: 37 U/L
ALT SERPL-CCNC: 10 U/L
ANION GAP SERPL CALC-SCNC: 11 MMOL/L
APTT BLD: 27.5 SEC
AST SERPL-CCNC: 16 U/L
B-HCG FREE SERPL-MCNC: NORMAL
BASOPHILS # BLD AUTO: 0.02 K/UL
BASOPHILS NFR BLD AUTO: 0.3 %
BILIRUB SERPL-MCNC: 0.2 MG/DL
BUN SERPL-MCNC: 7 MG/DL
CALCIUM SERPL-MCNC: 9.3 MG/DL
CHLORIDE SERPL-SCNC: 103 MMOL/L
CLINICAL BIOCHEMIST REVIEW: NORMAL
CO2 SERPL-SCNC: 22 MMOL/L
CREAT SERPL-MCNC: 0.49 MG/DL
CREAT SPEC-SCNC: 20 MG/DL
CREAT/PROT UR: NORMAL RATIO
EOSINOPHIL # BLD AUTO: 0.11 K/UL
EOSINOPHIL NFR BLD AUTO: 1.6 %
FREE BETA HCG 1ST TRIMESTER: NORMAL
GLUCOSE SERPL-MCNC: 79 MG/DL
HCT VFR BLD CALC: 31.8 %
HGB BLD-MCNC: 10.2 G/DL
IMM GRANULOCYTES NFR BLD AUTO: 0.1 %
INHIBIN A SERPL-MCNC: NORMAL
INR PPP: 0.95 RATIO
LYMPHOCYTES # BLD AUTO: 1.64 K/UL
LYMPHOCYTES NFR BLD AUTO: 24.1 %
MAN DIFF?: NORMAL
MCHC RBC-ENTMCNC: 31.2 PG
MCHC RBC-ENTMCNC: 32.1 GM/DL
MCV RBC AUTO: 97.2 FL
MONOCYTES # BLD AUTO: 0.43 K/UL
MONOCYTES NFR BLD AUTO: 6.3 %
NEUTROPHILS # BLD AUTO: 4.6 K/UL
NEUTROPHILS NFR BLD AUTO: 67.6 %
NOTES NTD: NORMAL
NT: NORMAL
PAPP-A SERPL-ACNC: NORMAL
PLATELET # BLD AUTO: 245 K/UL
POTASSIUM SERPL-SCNC: 4.3 MMOL/L
PROT SERPL-MCNC: 6.3 G/DL
PROT UR-MCNC: <4 MG/DL
PT BLD: 11.2 SEC
RBC # BLD: 3.27 M/UL
RBC # FLD: 13.6 %
SODIUM SERPL-SCNC: 136 MMOL/L
T4 FREE SERPL-MCNC: 1.2 NG/DL
TSH SERPL-ACNC: 0.06 UIU/ML
U ESTRIOL SERPL-SCNC: NORMAL
URATE SERPL-MCNC: 3.3 MG/DL
WBC # FLD AUTO: 6.81 K/UL

## 2020-09-04 ENCOUNTER — APPOINTMENT (OUTPATIENT)
Dept: ANTEPARTUM | Facility: CLINIC | Age: 30
End: 2020-09-04
Payer: COMMERCIAL

## 2020-09-04 ENCOUNTER — NON-APPOINTMENT (OUTPATIENT)
Age: 30
End: 2020-09-04

## 2020-09-04 ENCOUNTER — ASOB RESULT (OUTPATIENT)
Age: 30
End: 2020-09-04

## 2020-09-04 ENCOUNTER — APPOINTMENT (OUTPATIENT)
Dept: OBGYN | Facility: CLINIC | Age: 30
End: 2020-09-04
Payer: COMMERCIAL

## 2020-09-04 VITALS
DIASTOLIC BLOOD PRESSURE: 66 MMHG | BODY MASS INDEX: 24.91 KG/M2 | SYSTOLIC BLOOD PRESSURE: 106 MMHG | WEIGHT: 155 LBS | HEIGHT: 66 IN

## 2020-09-04 DIAGNOSIS — O35.9XX0 MATERNAL CARE FOR (SUSPECTED) FETAL ABNORMALITY AND DAMAGE, UNSPECIFIED, NOT APPLICABLE OR UNSPECIFIED: ICD-10-CM

## 2020-09-04 PROCEDURE — 76817 TRANSVAGINAL US OBSTETRIC: CPT

## 2020-09-04 PROCEDURE — 99213 OFFICE O/P EST LOW 20 MIN: CPT

## 2020-09-04 PROCEDURE — 76811 OB US DETAILED SNGL FETUS: CPT

## 2020-09-07 ENCOUNTER — NON-APPOINTMENT (OUTPATIENT)
Age: 30
End: 2020-09-07

## 2020-09-07 LAB
T3 SERPL-MCNC: 160 NG/DL
T3FREE SERPL-MCNC: 2.57 PG/ML
T3RU NFR SERPL: 1.5 TBI
T4 FREE SERPL-MCNC: 1 NG/DL
T4 SERPL-MCNC: 9.2 UG/DL
THYROGLOB AB SERPL-ACNC: <20 IU/ML
THYROPEROXIDASE AB SERPL IA-ACNC: <10 IU/ML
TSH SERPL-ACNC: 0.63 UIU/ML
TSI ACT/NOR SER: <0.1 IU/L

## 2020-09-08 ENCOUNTER — APPOINTMENT (OUTPATIENT)
Dept: PEDIATRIC CARDIOLOGY | Facility: CLINIC | Age: 30
End: 2020-09-08
Payer: COMMERCIAL

## 2020-09-08 ENCOUNTER — OUTPATIENT (OUTPATIENT)
Dept: OUTPATIENT SERVICES | Age: 30
LOS: 1 days | Discharge: HOME | End: 2020-09-08

## 2020-09-08 PROCEDURE — 76827 ECHO EXAM OF FETAL HEART: CPT

## 2020-09-08 PROCEDURE — 76825 ECHO EXAM OF FETAL HEART: CPT

## 2020-09-08 PROCEDURE — 93325 DOPPLER ECHO COLOR FLOW MAPG: CPT

## 2020-10-01 ENCOUNTER — ASOB RESULT (OUTPATIENT)
Age: 30
End: 2020-10-01

## 2020-10-01 ENCOUNTER — NON-APPOINTMENT (OUTPATIENT)
Age: 30
End: 2020-10-01

## 2020-10-01 ENCOUNTER — APPOINTMENT (OUTPATIENT)
Dept: OBGYN | Facility: CLINIC | Age: 30
End: 2020-10-01
Payer: COMMERCIAL

## 2020-10-01 ENCOUNTER — APPOINTMENT (OUTPATIENT)
Dept: ANTEPARTUM | Facility: CLINIC | Age: 30
End: 2020-10-01
Payer: COMMERCIAL

## 2020-10-01 VITALS
HEIGHT: 66 IN | SYSTOLIC BLOOD PRESSURE: 118 MMHG | BODY MASS INDEX: 26.03 KG/M2 | WEIGHT: 162 LBS | DIASTOLIC BLOOD PRESSURE: 70 MMHG

## 2020-10-01 DIAGNOSIS — Z34.92 ENCOUNTER FOR SUPERVISION OF NORMAL PREGNANCY, UNSPECIFIED, SECOND TRIMESTER: ICD-10-CM

## 2020-10-01 PROCEDURE — 76816 OB US FOLLOW-UP PER FETUS: CPT

## 2020-10-01 PROCEDURE — 99213 OFFICE O/P EST LOW 20 MIN: CPT

## 2020-10-02 ENCOUNTER — APPOINTMENT (OUTPATIENT)
Dept: ANTEPARTUM | Facility: CLINIC | Age: 30
End: 2020-10-02

## 2020-10-02 LAB — GLUCOSE 1H P 50 G GLC PO SERPL-MCNC: 80 MG/DL

## 2020-11-05 ENCOUNTER — APPOINTMENT (OUTPATIENT)
Dept: OBGYN | Facility: CLINIC | Age: 30
End: 2020-11-05
Payer: COMMERCIAL

## 2020-11-05 ENCOUNTER — ASOB RESULT (OUTPATIENT)
Age: 30
End: 2020-11-05

## 2020-11-05 ENCOUNTER — APPOINTMENT (OUTPATIENT)
Dept: ANTEPARTUM | Facility: CLINIC | Age: 30
End: 2020-11-05
Payer: COMMERCIAL

## 2020-11-05 VITALS
HEIGHT: 66 IN | DIASTOLIC BLOOD PRESSURE: 70 MMHG | BODY MASS INDEX: 26.9 KG/M2 | SYSTOLIC BLOOD PRESSURE: 111 MMHG | WEIGHT: 167.38 LBS

## 2020-11-05 PROCEDURE — 90471 IMMUNIZATION ADMIN: CPT

## 2020-11-05 PROCEDURE — 90686 IIV4 VACC NO PRSV 0.5 ML IM: CPT

## 2020-11-05 PROCEDURE — 81003 URINALYSIS AUTO W/O SCOPE: CPT | Mod: QW

## 2020-11-05 PROCEDURE — 99213 OFFICE O/P EST LOW 20 MIN: CPT | Mod: 25

## 2020-11-05 PROCEDURE — 76816 OB US FOLLOW-UP PER FETUS: CPT

## 2020-11-06 LAB
BASOPHILS # BLD AUTO: 0.02 K/UL
BASOPHILS NFR BLD AUTO: 0.3 %
EOSINOPHIL # BLD AUTO: 0.07 K/UL
EOSINOPHIL NFR BLD AUTO: 0.9 %
HCT VFR BLD CALC: 33.4 %
HGB BLD-MCNC: 10.2 G/DL
HIV1+2 AB SPEC QL IA.RAPID: NONREACTIVE
IMM GRANULOCYTES NFR BLD AUTO: 0.5 %
LYMPHOCYTES # BLD AUTO: 1.12 K/UL
LYMPHOCYTES NFR BLD AUTO: 15.2 %
MAN DIFF?: NORMAL
MCHC RBC-ENTMCNC: 30.5 GM/DL
MCHC RBC-ENTMCNC: 31.3 PG
MCV RBC AUTO: 102.5 FL
MONOCYTES # BLD AUTO: 0.53 K/UL
MONOCYTES NFR BLD AUTO: 7.2 %
NEUTROPHILS # BLD AUTO: 5.61 K/UL
NEUTROPHILS NFR BLD AUTO: 75.9 %
PLATELET # BLD AUTO: 253 K/UL
RBC # BLD: 3.26 M/UL
RBC # FLD: 13.2 %
T PALLIDUM AB SER QL IA: NEGATIVE
WBC # FLD AUTO: 7.39 K/UL

## 2020-11-16 ENCOUNTER — RX RENEWAL (OUTPATIENT)
Age: 30
End: 2020-11-16

## 2020-11-16 RX ORDER — VITAMIN A, VITAMIN C, VITAMIN D-3, VITAMIN E, VITAMIN B-1, VITAMIN B-2, NIACIN, VITAMIN B-6, CALCIUM, IRON, ZINC, COPPER 4000; 120; 400; 22; 1.84; 3; 20; 10; 1; 12; 200; 27; 25; 2 [IU]/1; MG/1; [IU]/1; MG/1; MG/1; MG/1; MG/1; MG/1; MG/1; UG/1; MG/1; MG/1; MG/1; MG/1
27-1 TABLET ORAL DAILY
Qty: 30 | Refills: 3 | Status: ACTIVE | COMMUNITY
Start: 2020-07-13 | End: 1900-01-01

## 2020-12-04 ENCOUNTER — APPOINTMENT (OUTPATIENT)
Dept: ANTEPARTUM | Facility: CLINIC | Age: 30
End: 2020-12-04
Payer: COMMERCIAL

## 2020-12-04 ENCOUNTER — ASOB RESULT (OUTPATIENT)
Age: 30
End: 2020-12-04

## 2020-12-04 ENCOUNTER — NON-APPOINTMENT (OUTPATIENT)
Age: 30
End: 2020-12-04

## 2020-12-04 ENCOUNTER — APPOINTMENT (OUTPATIENT)
Dept: OBGYN | Facility: CLINIC | Age: 30
End: 2020-12-04
Payer: COMMERCIAL

## 2020-12-04 ENCOUNTER — LABORATORY RESULT (OUTPATIENT)
Age: 30
End: 2020-12-04

## 2020-12-04 VITALS
DIASTOLIC BLOOD PRESSURE: 74 MMHG | HEIGHT: 66 IN | SYSTOLIC BLOOD PRESSURE: 116 MMHG | WEIGHT: 171 LBS | BODY MASS INDEX: 27.48 KG/M2

## 2020-12-04 VITALS — TEMPERATURE: 97.3 F

## 2020-12-04 DIAGNOSIS — Z34.83 ENCOUNTER FOR SUPERVISION OF OTHER NORMAL PREGNANCY, THIRD TRIMESTER: ICD-10-CM

## 2020-12-04 DIAGNOSIS — O14.10 SEVERE PRE-ECLAMPSIA, UNSPECIFIED TRIMESTER: ICD-10-CM

## 2020-12-04 PROCEDURE — 90471 IMMUNIZATION ADMIN: CPT

## 2020-12-04 PROCEDURE — 99072 ADDL SUPL MATRL&STAF TM PHE: CPT

## 2020-12-04 PROCEDURE — 90715 TDAP VACCINE 7 YRS/> IM: CPT

## 2020-12-04 PROCEDURE — 99213 OFFICE O/P EST LOW 20 MIN: CPT | Mod: 25

## 2020-12-04 PROCEDURE — 76816 OB US FOLLOW-UP PER FETUS: CPT

## 2020-12-13 LAB
ALBUMIN SERPL ELPH-MCNC: 3.5 G/DL
ALP BLD-CCNC: 98 U/L
ALT SERPL-CCNC: 17 U/L
ANION GAP SERPL CALC-SCNC: 9 MMOL/L
APTT BLD: 25.2 SEC
AST SERPL-CCNC: 18 U/L
BASOPHILS # BLD AUTO: 0.03 K/UL
BASOPHILS NFR BLD AUTO: 0.4 %
BILIRUB SERPL-MCNC: <0.2 MG/DL
BUN SERPL-MCNC: 10 MG/DL
CALCIUM SERPL-MCNC: 9.3 MG/DL
CHLORIDE SERPL-SCNC: 104 MMOL/L
CO2 SERPL-SCNC: 23 MMOL/L
CREAT SERPL-MCNC: 0.46 MG/DL
CREAT SPEC-SCNC: 38 MG/DL
CREAT/PROT UR: 0.1 RATIO
EOSINOPHIL # BLD AUTO: 0.04 K/UL
EOSINOPHIL NFR BLD AUTO: 0.6 %
GLUCOSE SERPL-MCNC: 94 MG/DL
HCT VFR BLD CALC: 33.2 %
HGB BLD-MCNC: 10.4 G/DL
IMM GRANULOCYTES NFR BLD AUTO: 0.4 %
INR PPP: 0.93 RATIO
LYMPHOCYTES # BLD AUTO: 1.17 K/UL
LYMPHOCYTES NFR BLD AUTO: 16.8 %
MAN DIFF?: NORMAL
MCHC RBC-ENTMCNC: 30.9 PG
MCHC RBC-ENTMCNC: 31.3 GM/DL
MCV RBC AUTO: 98.5 FL
MONOCYTES # BLD AUTO: 0.45 K/UL
MONOCYTES NFR BLD AUTO: 6.5 %
NEUTROPHILS # BLD AUTO: 5.24 K/UL
NEUTROPHILS NFR BLD AUTO: 75.3 %
PLATELET # BLD AUTO: 240 K/UL
POTASSIUM SERPL-SCNC: 3.9 MMOL/L
PROT SERPL-MCNC: 6 G/DL
PROT UR-MCNC: 5 MG/DL
PT BLD: 11 SEC
RBC # BLD: 3.37 M/UL
RBC # FLD: 12.7 %
SODIUM SERPL-SCNC: 136 MMOL/L
URATE SERPL-MCNC: 3.1 MG/DL
WBC # FLD AUTO: 6.96 K/UL

## 2020-12-16 PROBLEM — Z01.419 ENCOUNTER FOR ROUTINE GYNECOLOGICAL EXAMINATION: Status: RESOLVED | Noted: 2017-03-02 | Resolved: 2020-12-16

## 2020-12-17 ENCOUNTER — APPOINTMENT (OUTPATIENT)
Dept: OBGYN | Facility: CLINIC | Age: 30
End: 2020-12-17

## 2020-12-18 ENCOUNTER — APPOINTMENT (OUTPATIENT)
Dept: OBGYN | Facility: CLINIC | Age: 30
End: 2020-12-18
Payer: COMMERCIAL

## 2020-12-18 ENCOUNTER — NON-APPOINTMENT (OUTPATIENT)
Age: 30
End: 2020-12-18

## 2020-12-18 VITALS
HEIGHT: 66 IN | WEIGHT: 173 LBS | BODY MASS INDEX: 27.8 KG/M2 | DIASTOLIC BLOOD PRESSURE: 70 MMHG | SYSTOLIC BLOOD PRESSURE: 112 MMHG

## 2020-12-18 VITALS — TEMPERATURE: 97.3 F

## 2020-12-18 PROCEDURE — 99213 OFFICE O/P EST LOW 20 MIN: CPT

## 2020-12-18 PROCEDURE — 99072 ADDL SUPL MATRL&STAF TM PHE: CPT

## 2020-12-21 PROBLEM — Z01.419 ENCOUNTER FOR GYNECOLOGICAL EXAMINATION WITH PAPANICOLAOU SMEAR OF CERVIX: Status: RESOLVED | Noted: 2019-04-16 | Resolved: 2020-12-21

## 2020-12-31 ENCOUNTER — APPOINTMENT (OUTPATIENT)
Dept: OBGYN | Facility: CLINIC | Age: 30
End: 2020-12-31
Payer: COMMERCIAL

## 2020-12-31 ENCOUNTER — NON-APPOINTMENT (OUTPATIENT)
Age: 30
End: 2020-12-31

## 2020-12-31 VITALS
WEIGHT: 176 LBS | HEIGHT: 66 IN | DIASTOLIC BLOOD PRESSURE: 77 MMHG | BODY MASS INDEX: 28.28 KG/M2 | SYSTOLIC BLOOD PRESSURE: 115 MMHG

## 2020-12-31 PROCEDURE — 99213 OFFICE O/P EST LOW 20 MIN: CPT

## 2020-12-31 PROCEDURE — 99072 ADDL SUPL MATRL&STAF TM PHE: CPT

## 2021-01-03 LAB
GP B STREP DNA SPEC QL NAA+PROBE: NORMAL
GP B STREP DNA SPEC QL NAA+PROBE: NOT DETECTED
SOURCE GBS: NORMAL

## 2021-01-04 ENCOUNTER — APPOINTMENT (OUTPATIENT)
Dept: ANTEPARTUM | Facility: CLINIC | Age: 31
End: 2021-01-04
Payer: COMMERCIAL

## 2021-01-04 ENCOUNTER — ASOB RESULT (OUTPATIENT)
Age: 31
End: 2021-01-04

## 2021-01-04 PROCEDURE — 76816 OB US FOLLOW-UP PER FETUS: CPT

## 2021-01-04 PROCEDURE — 99072 ADDL SUPL MATRL&STAF TM PHE: CPT

## 2021-01-07 ENCOUNTER — OUTPATIENT (OUTPATIENT)
Dept: OUTPATIENT SERVICES | Facility: HOSPITAL | Age: 31
LOS: 1 days | End: 2021-01-07
Payer: COMMERCIAL

## 2021-01-07 VITALS
WEIGHT: 175.05 LBS | OXYGEN SATURATION: 98 % | SYSTOLIC BLOOD PRESSURE: 128 MMHG | HEART RATE: 110 BPM | TEMPERATURE: 99 F | RESPIRATION RATE: 18 BRPM | DIASTOLIC BLOOD PRESSURE: 89 MMHG | HEIGHT: 65 IN

## 2021-01-07 DIAGNOSIS — Z98.891 HISTORY OF UTERINE SCAR FROM PREVIOUS SURGERY: ICD-10-CM

## 2021-01-07 DIAGNOSIS — Z34.90 ENCOUNTER FOR SUPERVISION OF NORMAL PREGNANCY, UNSPECIFIED, UNSPECIFIED TRIMESTER: ICD-10-CM

## 2021-01-07 LAB
BLD GP AB SCN SERPL QL: NEGATIVE — SIGNIFICANT CHANGE UP
HCT VFR BLD CALC: 34.5 % — SIGNIFICANT CHANGE UP (ref 34.5–45)
HGB BLD-MCNC: 11.4 G/DL — LOW (ref 11.5–15.5)
MCHC RBC-ENTMCNC: 31.4 PG — SIGNIFICANT CHANGE UP (ref 27–34)
MCHC RBC-ENTMCNC: 33 GM/DL — SIGNIFICANT CHANGE UP (ref 32–36)
MCV RBC AUTO: 95 FL — SIGNIFICANT CHANGE UP (ref 80–100)
NRBC # BLD: 0 /100 WBCS — SIGNIFICANT CHANGE UP (ref 0–0)
PLATELET # BLD AUTO: 213 K/UL — SIGNIFICANT CHANGE UP (ref 150–400)
RBC # BLD: 3.63 M/UL — LOW (ref 3.8–5.2)
RBC # FLD: 12.8 % — SIGNIFICANT CHANGE UP (ref 10.3–14.5)
RH IG SCN BLD-IMP: POSITIVE — SIGNIFICANT CHANGE UP
WBC # BLD: 7.57 K/UL — SIGNIFICANT CHANGE UP (ref 3.8–10.5)
WBC # FLD AUTO: 7.57 K/UL — SIGNIFICANT CHANGE UP (ref 3.8–10.5)

## 2021-01-07 PROCEDURE — G0463: CPT

## 2021-01-07 PROCEDURE — 85027 COMPLETE CBC AUTOMATED: CPT

## 2021-01-07 PROCEDURE — 86900 BLOOD TYPING SEROLOGIC ABO: CPT

## 2021-01-07 PROCEDURE — 86901 BLOOD TYPING SEROLOGIC RH(D): CPT

## 2021-01-07 PROCEDURE — 86850 RBC ANTIBODY SCREEN: CPT

## 2021-01-07 RX ORDER — OXYTOCIN 10 UNIT/ML
333.33 VIAL (ML) INJECTION
Qty: 20 | Refills: 0 | Status: DISCONTINUED | OUTPATIENT
Start: 2021-01-16 | End: 2021-01-18

## 2021-01-07 NOTE — OB PST NOTE - HISTORY OF PRESENT ILLNESS
This is a 31 y/o female  This is a 31 y/o female with no significant PMH , S/P  at 34 weeks gestation previous pregnancy due to preeclampsia and placenta abruption.  This pregnancy has been uneventful and the patient is scheduled for repeat  on 2021.

## 2021-01-07 NOTE — OB PST NOTE - NSHPPHYSICALEXAM_GEN_ALL_CORE
Constitional: Pt well groomed, well nourished, no acute distress    Eyes: conjunctiva clear, PERRL    ENMT: oral mucosa moist, no erythema    Neck: FROM, supple, nonpalpable thyroid    Back: normal shape    Respiratory: CTA B/L, no rales, no wheezes, no rhonchi    Cardiovascular: RRR, no murmur, +S1, +S2    Gastrointestinal: gravid uterus, nontender    Extremities: no pedal edema    Vascular: radial 2+ B/L                    PD      2+  B/L    Neuro: A&Ox3, normal strength    Skin: warm and dry     Lymph nodes: normal anterior cervical B/L     Musculoskeletal: no calf tenderness    Psychiatric: normal affect, normal behavior

## 2021-01-07 NOTE — OB PST NOTE - ALERT: PERTINENT HISTORY
Fetal Sonogram/1st Trimester Sonogram/20 Week Level II Sonogram/Follow up Sonogram for Growth/Non Invasive Prenatal Screen (NIPS)/Ultra Screen at 12 Weeks

## 2021-01-14 ENCOUNTER — APPOINTMENT (OUTPATIENT)
Dept: OBGYN | Facility: CLINIC | Age: 31
End: 2021-01-14

## 2021-01-14 ENCOUNTER — NON-APPOINTMENT (OUTPATIENT)
Age: 31
End: 2021-01-14

## 2021-01-14 DIAGNOSIS — Z11.59 ENCOUNTER FOR SCREENING FOR OTHER VIRAL DISEASES: ICD-10-CM

## 2021-01-15 ENCOUNTER — TRANSCRIPTION ENCOUNTER (OUTPATIENT)
Age: 31
End: 2021-01-15

## 2021-01-16 ENCOUNTER — APPOINTMENT (OUTPATIENT)
Dept: OBGYN | Facility: CLINIC | Age: 31
End: 2021-01-16

## 2021-01-16 ENCOUNTER — NON-APPOINTMENT (OUTPATIENT)
Age: 31
End: 2021-01-16

## 2021-01-16 ENCOUNTER — INPATIENT (INPATIENT)
Facility: HOSPITAL | Age: 31
LOS: 1 days | Discharge: HOME | End: 2021-01-18
Attending: OBSTETRICS & GYNECOLOGY | Admitting: OBSTETRICS & GYNECOLOGY
Payer: COMMERCIAL

## 2021-01-16 VITALS — SYSTOLIC BLOOD PRESSURE: 128 MMHG | HEART RATE: 90 BPM | DIASTOLIC BLOOD PRESSURE: 84 MMHG

## 2021-01-16 DIAGNOSIS — Z98.891 HISTORY OF UTERINE SCAR FROM PREVIOUS SURGERY: ICD-10-CM

## 2021-01-16 LAB
ALBUMIN SERPL ELPH-MCNC: 3.4 G/DL — SIGNIFICANT CHANGE UP (ref 3.3–5)
ALP SERPL-CCNC: 156 U/L — HIGH (ref 40–120)
ALT FLD-CCNC: 16 U/L — SIGNIFICANT CHANGE UP (ref 10–45)
ANION GAP SERPL CALC-SCNC: 14 MMOL/L — SIGNIFICANT CHANGE UP (ref 5–17)
APPEARANCE UR: CLEAR — SIGNIFICANT CHANGE UP
APTT BLD: 24.2 SEC — LOW (ref 27.5–35.5)
AST SERPL-CCNC: 25 U/L — SIGNIFICANT CHANGE UP (ref 10–40)
BASOPHILS # BLD AUTO: 0.01 K/UL — SIGNIFICANT CHANGE UP (ref 0–0.2)
BASOPHILS NFR BLD AUTO: 0.2 % — SIGNIFICANT CHANGE UP (ref 0–2)
BILIRUB SERPL-MCNC: 0.1 MG/DL — LOW (ref 0.2–1.2)
BILIRUB UR-MCNC: NEGATIVE — SIGNIFICANT CHANGE UP
BLD GP AB SCN SERPL QL: NEGATIVE — SIGNIFICANT CHANGE UP
BUN SERPL-MCNC: 10 MG/DL — SIGNIFICANT CHANGE UP (ref 7–23)
CALCIUM SERPL-MCNC: 8.6 MG/DL — SIGNIFICANT CHANGE UP (ref 8.4–10.5)
CHLORIDE SERPL-SCNC: 103 MMOL/L — SIGNIFICANT CHANGE UP (ref 96–108)
CO2 SERPL-SCNC: 20 MMOL/L — LOW (ref 22–31)
COLOR SPEC: COLORLESS — SIGNIFICANT CHANGE UP
CREAT ?TM UR-MCNC: 14 MG/DL — SIGNIFICANT CHANGE UP
CREAT SERPL-MCNC: 0.5 MG/DL — SIGNIFICANT CHANGE UP (ref 0.5–1.3)
DIFF PNL FLD: NEGATIVE — SIGNIFICANT CHANGE UP
EOSINOPHIL # BLD AUTO: 0.04 K/UL — SIGNIFICANT CHANGE UP (ref 0–0.5)
EOSINOPHIL NFR BLD AUTO: 0.7 % — SIGNIFICANT CHANGE UP (ref 0–6)
FIBRINOGEN PPP-MCNC: 517 MG/DL — SIGNIFICANT CHANGE UP (ref 290–520)
GLUCOSE BLDC GLUCOMTR-MCNC: 90 MG/DL — SIGNIFICANT CHANGE UP (ref 70–99)
GLUCOSE SERPL-MCNC: 101 MG/DL — HIGH (ref 70–99)
GLUCOSE UR QL: NEGATIVE — SIGNIFICANT CHANGE UP
HBV SURFACE AG SERPL QL IA: SIGNIFICANT CHANGE UP
HCT VFR BLD CALC: 32.5 % — LOW (ref 34.5–45)
HGB BLD-MCNC: 11 G/DL — LOW (ref 11.5–15.5)
IMM GRANULOCYTES NFR BLD AUTO: 0.5 % — SIGNIFICANT CHANGE UP (ref 0–1.5)
INR BLD: 0.91 RATIO — SIGNIFICANT CHANGE UP (ref 0.88–1.16)
KETONES UR-MCNC: NEGATIVE — SIGNIFICANT CHANGE UP
LDH SERPL L TO P-CCNC: 173 U/L — SIGNIFICANT CHANGE UP (ref 50–242)
LEUKOCYTE ESTERASE UR-ACNC: NEGATIVE — SIGNIFICANT CHANGE UP
LYMPHOCYTES # BLD AUTO: 1.2 K/UL — SIGNIFICANT CHANGE UP (ref 1–3.3)
LYMPHOCYTES # BLD AUTO: 19.6 % — SIGNIFICANT CHANGE UP (ref 13–44)
MCHC RBC-ENTMCNC: 31.7 PG — SIGNIFICANT CHANGE UP (ref 27–34)
MCHC RBC-ENTMCNC: 33.8 GM/DL — SIGNIFICANT CHANGE UP (ref 32–36)
MCV RBC AUTO: 93.7 FL — SIGNIFICANT CHANGE UP (ref 80–100)
MONOCYTES # BLD AUTO: 0.41 K/UL — SIGNIFICANT CHANGE UP (ref 0–0.9)
MONOCYTES NFR BLD AUTO: 6.7 % — SIGNIFICANT CHANGE UP (ref 2–14)
NEUTROPHILS # BLD AUTO: 4.42 K/UL — SIGNIFICANT CHANGE UP (ref 1.8–7.4)
NEUTROPHILS NFR BLD AUTO: 72.3 % — SIGNIFICANT CHANGE UP (ref 43–77)
NITRITE UR-MCNC: NEGATIVE — SIGNIFICANT CHANGE UP
NRBC # BLD: 0 /100 WBCS — SIGNIFICANT CHANGE UP (ref 0–0)
PH UR: 6.5 — SIGNIFICANT CHANGE UP (ref 5–8)
PLATELET # BLD AUTO: 183 K/UL — SIGNIFICANT CHANGE UP (ref 150–400)
POTASSIUM SERPL-MCNC: 3.5 MMOL/L — SIGNIFICANT CHANGE UP (ref 3.5–5.3)
POTASSIUM SERPL-SCNC: 3.5 MMOL/L — SIGNIFICANT CHANGE UP (ref 3.5–5.3)
PROT ?TM UR-MCNC: <4 MG/DL — SIGNIFICANT CHANGE UP (ref 0–12)
PROT ?TM UR-MCNC: <4 MG/DL — SIGNIFICANT CHANGE UP (ref 0–12)
PROT SERPL-MCNC: 6.2 G/DL — SIGNIFICANT CHANGE UP (ref 6–8.3)
PROT UR-MCNC: NEGATIVE — SIGNIFICANT CHANGE UP
PROT/CREAT UR-RTO: <0.3 RATIO — HIGH (ref 0–0.2)
PROTHROM AB SERPL-ACNC: 11 SEC — SIGNIFICANT CHANGE UP (ref 10.6–13.6)
RBC # BLD: 3.47 M/UL — LOW (ref 3.8–5.2)
RBC # FLD: 13.2 % — SIGNIFICANT CHANGE UP (ref 10.3–14.5)
RH IG SCN BLD-IMP: POSITIVE — SIGNIFICANT CHANGE UP
SARS-COV-2 IGG SERPL QL IA: NEGATIVE — SIGNIFICANT CHANGE UP
SARS-COV-2 IGM SERPL IA-ACNC: <0.1 INDEX — SIGNIFICANT CHANGE UP
SARS-COV-2 N GENE NPH QL NAA+PROBE: NOT DETECTED
SODIUM SERPL-SCNC: 137 MMOL/L — SIGNIFICANT CHANGE UP (ref 135–145)
SP GR SPEC: 1 — LOW (ref 1.01–1.02)
T PALLIDUM AB TITR SER: NEGATIVE — SIGNIFICANT CHANGE UP
URATE SERPL-MCNC: 5.2 MG/DL — SIGNIFICANT CHANGE UP (ref 2.5–7)
UROBILINOGEN FLD QL: NEGATIVE — SIGNIFICANT CHANGE UP
WBC # BLD: 6.11 K/UL — SIGNIFICANT CHANGE UP (ref 3.8–10.5)
WBC # FLD AUTO: 6.11 K/UL — SIGNIFICANT CHANGE UP (ref 3.8–10.5)

## 2021-01-16 PROCEDURE — 59514 CESAREAN DELIVERY ONLY: CPT

## 2021-01-16 PROCEDURE — 88307 TISSUE EXAM BY PATHOLOGIST: CPT | Mod: 26

## 2021-01-16 RX ORDER — CITRIC ACID/SODIUM CITRATE 300-500 MG
15 SOLUTION, ORAL ORAL ONCE
Refills: 0 | Status: COMPLETED | OUTPATIENT
Start: 2021-01-16 | End: 2021-01-16

## 2021-01-16 RX ORDER — ACETAMINOPHEN 500 MG
975 TABLET ORAL
Refills: 0 | Status: DISCONTINUED | OUTPATIENT
Start: 2021-01-16 | End: 2021-01-18

## 2021-01-16 RX ORDER — METOCLOPRAMIDE HCL 10 MG
10 TABLET ORAL ONCE
Refills: 0 | Status: DISCONTINUED | OUTPATIENT
Start: 2021-01-16 | End: 2021-01-16

## 2021-01-16 RX ORDER — SODIUM CHLORIDE 9 MG/ML
1000 INJECTION, SOLUTION INTRAVENOUS
Refills: 0 | Status: DISCONTINUED | OUTPATIENT
Start: 2021-01-16 | End: 2021-01-16

## 2021-01-16 RX ORDER — CEFAZOLIN SODIUM 1 G
2000 VIAL (EA) INJECTION ONCE
Refills: 0 | Status: DISCONTINUED | OUTPATIENT
Start: 2021-01-16 | End: 2021-01-16

## 2021-01-16 RX ORDER — SODIUM CHLORIDE 9 MG/ML
1000 INJECTION, SOLUTION INTRAVENOUS
Refills: 0 | Status: DISCONTINUED | OUTPATIENT
Start: 2021-01-16 | End: 2021-01-18

## 2021-01-16 RX ORDER — LANOLIN
1 OINTMENT (GRAM) TOPICAL EVERY 6 HOURS
Refills: 0 | Status: DISCONTINUED | OUTPATIENT
Start: 2021-01-16 | End: 2021-01-18

## 2021-01-16 RX ORDER — FAMOTIDINE 10 MG/ML
20 INJECTION INTRAVENOUS ONCE
Refills: 0 | Status: DISCONTINUED | OUTPATIENT
Start: 2021-01-16 | End: 2021-01-16

## 2021-01-16 RX ORDER — DIPHENHYDRAMINE HCL 50 MG
25 CAPSULE ORAL EVERY 6 HOURS
Refills: 0 | Status: DISCONTINUED | OUTPATIENT
Start: 2021-01-16 | End: 2021-01-18

## 2021-01-16 RX ORDER — FAMOTIDINE 10 MG/ML
20 INJECTION INTRAVENOUS ONCE
Refills: 0 | Status: COMPLETED | OUTPATIENT
Start: 2021-01-16 | End: 2021-01-16

## 2021-01-16 RX ORDER — KETOROLAC TROMETHAMINE 30 MG/ML
30 SYRINGE (ML) INJECTION EVERY 6 HOURS
Refills: 0 | Status: DISCONTINUED | OUTPATIENT
Start: 2021-01-16 | End: 2021-01-17

## 2021-01-16 RX ORDER — MAGNESIUM HYDROXIDE 400 MG/1
30 TABLET, CHEWABLE ORAL
Refills: 0 | Status: DISCONTINUED | OUTPATIENT
Start: 2021-01-16 | End: 2021-01-18

## 2021-01-16 RX ORDER — OXYCODONE HYDROCHLORIDE 5 MG/1
5 TABLET ORAL
Refills: 0 | Status: DISCONTINUED | OUTPATIENT
Start: 2021-01-16 | End: 2021-01-18

## 2021-01-16 RX ORDER — SODIUM CHLORIDE 9 MG/ML
1000 INJECTION, SOLUTION INTRAVENOUS ONCE
Refills: 0 | Status: DISCONTINUED | OUTPATIENT
Start: 2021-01-16 | End: 2021-01-16

## 2021-01-16 RX ORDER — SIMETHICONE 80 MG/1
80 TABLET, CHEWABLE ORAL EVERY 4 HOURS
Refills: 0 | Status: DISCONTINUED | OUTPATIENT
Start: 2021-01-16 | End: 2021-01-18

## 2021-01-16 RX ORDER — CITRIC ACID/SODIUM CITRATE 300-500 MG
30 SOLUTION, ORAL ORAL ONCE
Refills: 0 | Status: DISCONTINUED | OUTPATIENT
Start: 2021-01-16 | End: 2021-01-16

## 2021-01-16 RX ORDER — OXYCODONE HYDROCHLORIDE 5 MG/1
5 TABLET ORAL ONCE
Refills: 0 | Status: DISCONTINUED | OUTPATIENT
Start: 2021-01-16 | End: 2021-01-18

## 2021-01-16 RX ORDER — OXYCODONE HYDROCHLORIDE 5 MG/1
10 TABLET ORAL
Refills: 0 | Status: DISCONTINUED | OUTPATIENT
Start: 2021-01-16 | End: 2021-01-18

## 2021-01-16 RX ORDER — IBUPROFEN 200 MG
600 TABLET ORAL EVERY 6 HOURS
Refills: 0 | Status: COMPLETED | OUTPATIENT
Start: 2021-01-16 | End: 2021-12-15

## 2021-01-16 RX ORDER — NALOXONE HYDROCHLORIDE 4 MG/.1ML
0.1 SPRAY NASAL
Refills: 0 | Status: DISCONTINUED | OUTPATIENT
Start: 2021-01-16 | End: 2021-01-18

## 2021-01-16 RX ORDER — HEPARIN SODIUM 5000 [USP'U]/ML
5000 INJECTION INTRAVENOUS; SUBCUTANEOUS EVERY 12 HOURS
Refills: 0 | Status: DISCONTINUED | OUTPATIENT
Start: 2021-01-16 | End: 2021-01-18

## 2021-01-16 RX ORDER — TETANUS TOXOID, REDUCED DIPHTHERIA TOXOID AND ACELLULAR PERTUSSIS VACCINE, ADSORBED 5; 2.5; 8; 8; 2.5 [IU]/.5ML; [IU]/.5ML; UG/.5ML; UG/.5ML; UG/.5ML
0.5 SUSPENSION INTRAMUSCULAR ONCE
Refills: 0 | Status: DISCONTINUED | OUTPATIENT
Start: 2021-01-16 | End: 2021-01-18

## 2021-01-16 RX ORDER — OXYTOCIN 10 UNIT/ML
333.33 VIAL (ML) INJECTION
Qty: 20 | Refills: 0 | Status: DISCONTINUED | OUTPATIENT
Start: 2021-01-16 | End: 2021-01-18

## 2021-01-16 RX ORDER — ONDANSETRON 8 MG/1
4 TABLET, FILM COATED ORAL EVERY 6 HOURS
Refills: 0 | Status: DISCONTINUED | OUTPATIENT
Start: 2021-01-16 | End: 2021-01-18

## 2021-01-16 RX ORDER — HYDROMORPHONE HYDROCHLORIDE 2 MG/ML
0.5 INJECTION INTRAMUSCULAR; INTRAVENOUS; SUBCUTANEOUS
Refills: 0 | Status: DISCONTINUED | OUTPATIENT
Start: 2021-01-16 | End: 2021-01-18

## 2021-01-16 RX ORDER — MORPHINE SULFATE 50 MG/1
0.15 CAPSULE, EXTENDED RELEASE ORAL ONCE
Refills: 0 | Status: DISCONTINUED | OUTPATIENT
Start: 2021-01-16 | End: 2021-01-17

## 2021-01-16 RX ADMIN — FAMOTIDINE 20 MILLIGRAM(S): 10 INJECTION INTRAVENOUS at 09:54

## 2021-01-16 RX ADMIN — Medication 975 MILLIGRAM(S): at 21:05

## 2021-01-16 RX ADMIN — HEPARIN SODIUM 5000 UNIT(S): 5000 INJECTION INTRAVENOUS; SUBCUTANEOUS at 21:05

## 2021-01-16 RX ADMIN — Medication 15 MILLILITER(S): at 09:55

## 2021-01-16 RX ADMIN — ONDANSETRON 4 MILLIGRAM(S): 8 TABLET, FILM COATED ORAL at 18:25

## 2021-01-16 RX ADMIN — Medication 30 MILLIGRAM(S): at 18:22

## 2021-01-16 NOTE — OB RN PATIENT PROFILE - POST PARTUM DEPRESSION SCREEN OB 2
Pt received from cath lab, transfer from [address] [place] [address],[id number]  A+Ox4, denies pain  Went through right femoral; dressing clean dry intact  No hematoma noted  Pedal pulses +2 b/l  Pt on frequent vitals   Will monitor no

## 2021-01-16 NOTE — OB PROVIDER H&P - HISTORY OF PRESENT ILLNESS
R1 Admission H&P    Subjective  HPI: 30yoF  39w1d gestational age presents for scheduled repeat c/s. +FM. -LOF. -CTXs. -VB. Pt denies any other concerns. Hx of sPEC in last pregnancy, no elevated BPs this pregnancy.    – PNC: Denies prenatal issues. EFW 3400  – OBHx: pLTCS at 34w for abpruption, c/b sPEc requiring Mg. 1x TOP in   – GynHx: denies  – PMH: denies  – PSH: C/S x1  – Psych: denies   – Social: denies   – Meds: PNV, ASA  – Allergies: NKDA  – Will accept blood transfusions? Yes

## 2021-01-16 NOTE — OB NEONATOLOGY/PEDIATRICIAN DELIVERY SUMMARY - NSPEDSNEONOTESA_OBGYN_ALL_OB_FT
Baby LAMONTE is a 39+0 wk GA female born to a 29 y/o  mother via repeat C/S. Maternal history uncomplicated. Prenatal history uncomplicated. Maternal BT A+. PNL include HIV neg, RPR NR, and rubella immune. HbSAb was sent.  GBS neg on 1/15. NRNL, ROM at delivery, clear fluids. Baby born with poor color, tone, and with large amount of swallowed amniotic fluid. Baby was WDSS. Pulse-ox was placed at 2 MOL, after no improvement in color, and CPAP was initiated for 5 minutes, with max settings of 5/40%, titrated for SpO2 per minute of life. Apgars 6/8. EOS not required. Mom plans to breastfeed, would like hepB. Mother is COVID negative.

## 2021-01-16 NOTE — CHART NOTE - NSCHARTNOTEFT_GEN_A_CORE
Patient S/P Repeat C/S, Patient had a negative COVID test, but  tested positive, results were available after the C/S. The patient and her  werer informed. The  has tested weekly for COVID and was negative until now. He is asymptoomatic, and advised to quarrantie and would not be able to stay postpartum. the patient obtained her COVID test on 1/14/21, and we'll plan to repeat the COVID test on Monday 1/18/21 POD#2 prior to discharge.

## 2021-01-16 NOTE — OB RN INTRAOPERATIVE NOTE - NS_PROVIDERPREP_OBGYN_ALL_OB
Name: Bernard Nash  YOB: 1948    DATE OF SURGERY: 7/24/2017    PREOPERATIVE DIAGNOSIS: Left knee end-stage osteoarthritis    POSTOPERATIVE DIAGNOSIS: Left knee end-stage osteoarthritis    PROCEDURE PERFORMED: Left total knee replacement    SURGEON: Brooks Mckeon M.D.    ASSISTANT: PEDRO LATHAM    IMPLANTS: [employer]:     Implant Name Type Inv. Item Serial No.  Lot No. LRB No. Used   CMT BONE PALACOS R PLS/G W GENT 1X40 - [id number] Implant CMT BONE PALACOS R PLS/G W GENT 1X40  [email or web address] [id number] Left 1   COMP FEM LEGION OXINIUM CR SZ5 LT - [id number] Implant COMP FEM LEGION OXINIUM CR SZ5 LT  [employer] [id number] Left 1   BASE TIB GEN2 NONPOR TI SZ4 LT - [id number] Implant BASE TIB GEN2 NONPOR TI SZ4 LT  [employer] [id number] Left 1   PAT GEN2 BICONVEX [id number] - [id number] Implant PAT GEN2 BICONVEX [id number]  [employer] [id number] Left 1   INSRT KN CR FLX DEEP GEN2 SZ3 4 9MM - [id number] Implant INSRT KN CR FLX DEEP GEN2 SZ3 4 9MM   [employer] [id number] Left 1       Estimated Blood Loss: 200cc  Specimens : none  Complications: none    DESCRIPTION OF PROCEDURE: The patient was taken to the operating room and placed in the supine position. A sequential compression device was carefully placed on the non-operative leg. Preoperative antibiotics were administered. Surgical time out was performed. After adequate induction of anesthesia, the leg was prepped and draped in the usual sterile fashion, exsanguinated with an Esmarch bandage and the tourniquet inflated to 250 mmHg. A midline incision was performed followed by a medial parapatellar arthrotomy. The patella was subluxed laterally.  A portion of the fat pad, ACL, and anterior horns of the meniscus were excised. The drill hole was placed in the distal femur and the canal was the irrigated and suctioned. The IM kirby was placed and a 5 degree distal valgus cut was performed on the femur. The femur was then  sized with a sizing guide. The femoral cutting block was placed and all femoral cuts were performed. The proximal tibia was exposed. We used the extramedullary tibial cutting guide set for removal of 9mm of bone off the high side. The tibial cut was performed. The posterior horns of the menisci were excised. The posterior osteophytes were removed. Flexion extension blocks were then used to balance the knee. The tibial cut surface was then sized with the sizing templates and the tibial and femoral trial were then placed. The knee was placed in full extension and then the tibial tray rotation was then matched to the femoral rotation and marked.    Attention was then placed to the patella. The patella was noted to track centrally through range of motion. The patella was then sized with the trials. The thickness of the patella was then measured. The patella was resurfaced and the surrounding osteophytes were removed. The preoperative thickness was reproduced. The patella tracked centrally through range of motion.   At this point all trial components were removed, the knee was copiously irrigated with pulsed lavage, and the knee was injected with anesthetic cocktail solution. The cut surfaces were then dried with clean lap sponges, and the components were cemented tibia, followed by femur, then patella. The knee was held in full extension and all excess cement was removed. The knee was held still until the cement had completely hardened. We then placed the trial polyethylene spacer which resulted in full extension and excellent flexion-extension balance. We placed the final polyethylene spacer.   The knee was then copiously irrigated. The tourniquet was then released. There was excellent hemostasis. We placed a one-eighth inch Hemovac drain. We closed the knee in multiple layers in standard fashion. Sterile dressing were applied. At the end of the case, the sponge and needle counts were reported as being correct. There  were no known complications. The patient was then transported to the recovery room.      Brooks Mckeon M.D.     No

## 2021-01-16 NOTE — OB PROVIDER H&P - ATTENDING COMMENTS
Agree with above, Patient seen by me.  31 y/o  at 39wks 1 day. Admitted for a Repeat C/S. History of IUGR, preeclampsia and placental abruption with her first delivery. Patient was on baby asprin with this pregnancy. No headaches, no N/V, no RUQ pain. +FM, no vaginal bleeding, no leakage of fluid.     PE: Pt in NAD   +FH.   A/P: 31 y/o  at 39wks 1 day history of preeclampsia with first pregnancy  Patient admitted for Repeat C/S. counseled informed consent signed and  on chart. Discussed risk of bleeding, infection, adhesions/scar tissue, possible bowel and or bladder injury. Advised not conceiving for at least six months postpartum.   Patient COVID negative  Will refer to cardio-ob.

## 2021-01-16 NOTE — OB PROVIDER H&P - NSHPPHYSICALEXAM_GEN_ALL_CORE
Objective  Vital signs  VS  T(C): 37.3 (01-16-21 @ 09:25)  HR: 90 (01-16-21 @ 09:51)  BP: 128/98 (01-16-21 @ 09:25)  RR: 18 (01-16-21 @ 09:25)  SpO2: 99% (01-16-21 @ 09:51)    – PE:   CV: RRR  Pulm: breathing comfortably on RA  Abd: gravid, nontender  Extr: moving all extremities with ease    – FHT: baseline 140, mod variability, +accels, -decels  – [address]: none

## 2021-01-16 NOTE — OB PROVIDER H&P - ASSESSMENT
Assessment  – 31yo  at 39w1d presents for scheduled rLTCS. History notable for sPEC and 34wk delivery 2/2 abruption in last pregnancy. This pregnancy has been uncomplicated to date.     Plan  Pre op labs  HELLP labs sent due to hx of sPEC  Fetus: cat 1 tracing.   Prenatal issues: none  COVID negative  For scheduled repeat section    Patient discussed with attending physician, Dr. Deluca.      Rossana Dunaway MD PGY1

## 2021-01-16 NOTE — PRE-ANESTHESIA EVALUATION ADULT - NSANTHPMHFT_GEN_ALL_CORE
H&P -   31 yo F  , ~ 36 weeks  W/ 2nd Baby  PMHX -   Denies sig pmhx  No issues with pregnancy  ADMISSION -  For schedualed repeat c/s  Not in labor

## 2021-01-16 NOTE — PROVIDER CONTACT NOTE (OTHER) - ACTION/TREATMENT ORDERED:
Armando will be in to evaluate pt; anesthesiologist MD Zhang at bedside to evaluate pt-pt cleared from anesthesia as per MD Zhang-F/S to be drawn as per Armando. Armando at bedside at 1521

## 2021-01-16 NOTE — OB PROVIDER DELIVERY SUMMARY - NSPROVIDERDELIVERYNOTE_OBGYN_ALL_OB_FT
Delivery of liveborn female infant from cephalic position. Weight 3359g, Apgars 7/8. Dense bladder adhesions to anterior abdominal wall. Omental adhesion to anterior abdominal wall. 2cm L angle hysterotomy extension. Bladder backfilled with 300 cc of methylene blue-bladder intact. Peritoneum around bladder reinforced with 2-0 vicryl suture. Intercede used  Triton: 164cc  IVF 2000cc  UOP 350cc. Delivery of liveborn female infant from cephalic position. Weight 3359g, Apgars 7/8. Dense bladder adhesions to anterior abdominal wall. Omental adhesion to anterior abdominal wall. 2cm L angle hysterotomy extension. Bladder backfilled with 300 cc of methylene blue-bladder intact. Peritoneum around bladder reinforced with 2-0 vicryl suture. Intercede used  Triton: 164cc  IVF 2000cc  UOP 350cc.    Agree with above, I was present for delivery. Patient S/P Repeat C/S, lower uterine segment transverse incision,  patient delivered a live female infant APGARS 7 and 8. Amniotic fluid was clear. Omental adhesions to the anterior abdominal wall were lysed and dense adhesions to the bladder were lysed.   Hysterotomy had an extension at the left angle and repaired. Methylene blue bladder test, no extravastation noted. Superficial 1.5 cm bladder serosal denunded/ and repaired with a running 2-0vicrly suture. Excellent hmeostasis. Interceed placed. Excellent hemostasis,. Counts correct. EBL 164cc with the trition. Patient went to RR in stable condition.  Marie Deluca MD

## 2021-01-16 NOTE — OB RN DELIVERY SUMMARY - NS_SEPSISRSKCALC_OBGYN_ALL_OB_FT
EOS calculated successfully. EOS Risk Factor: 0.5/1000 live births ([facility][note 3] national incidence); GA=39w2d; Temp=99.1; ROM=0.017; GBS='Negative'; Antibiotics='No antibiotics or any antibiotics < 2 hrs prior to birth'

## 2021-01-17 LAB
BASOPHILS # BLD AUTO: 0.01 K/UL — SIGNIFICANT CHANGE UP (ref 0–0.2)
BASOPHILS NFR BLD AUTO: 0.1 % — SIGNIFICANT CHANGE UP (ref 0–2)
EOSINOPHIL # BLD AUTO: 0.01 K/UL — SIGNIFICANT CHANGE UP (ref 0–0.5)
EOSINOPHIL NFR BLD AUTO: 0.1 % — SIGNIFICANT CHANGE UP (ref 0–6)
HCT VFR BLD CALC: 29.7 % — LOW (ref 34.5–45)
HGB BLD-MCNC: 9.8 G/DL — LOW (ref 11.5–15.5)
IMM GRANULOCYTES NFR BLD AUTO: 0.4 % — SIGNIFICANT CHANGE UP (ref 0–1.5)
LYMPHOCYTES # BLD AUTO: 1.61 K/UL — SIGNIFICANT CHANGE UP (ref 1–3.3)
LYMPHOCYTES # BLD AUTO: 13.7 % — SIGNIFICANT CHANGE UP (ref 13–44)
MCHC RBC-ENTMCNC: 31.3 PG — SIGNIFICANT CHANGE UP (ref 27–34)
MCHC RBC-ENTMCNC: 33 GM/DL — SIGNIFICANT CHANGE UP (ref 32–36)
MCV RBC AUTO: 94.9 FL — SIGNIFICANT CHANGE UP (ref 80–100)
MONOCYTES # BLD AUTO: 1 K/UL — HIGH (ref 0–0.9)
MONOCYTES NFR BLD AUTO: 8.5 % — SIGNIFICANT CHANGE UP (ref 2–14)
NEUTROPHILS # BLD AUTO: 9.1 K/UL — HIGH (ref 1.8–7.4)
NEUTROPHILS NFR BLD AUTO: 77.2 % — HIGH (ref 43–77)
NRBC # BLD: 0 /100 WBCS — SIGNIFICANT CHANGE UP (ref 0–0)
PLATELET # BLD AUTO: 169 K/UL — SIGNIFICANT CHANGE UP (ref 150–400)
RBC # BLD: 3.13 M/UL — LOW (ref 3.8–5.2)
RBC # FLD: 13.1 % — SIGNIFICANT CHANGE UP (ref 10.3–14.5)
WBC # BLD: 11.78 K/UL — HIGH (ref 3.8–10.5)
WBC # FLD AUTO: 11.78 K/UL — HIGH (ref 3.8–10.5)

## 2021-01-17 RX ORDER — NALBUPHINE HYDROCHLORIDE 10 MG/ML
2 INJECTION, SOLUTION INTRAMUSCULAR; INTRAVENOUS; SUBCUTANEOUS ONCE
Refills: 0 | Status: DISCONTINUED | OUTPATIENT
Start: 2021-01-17 | End: 2021-01-18

## 2021-01-17 RX ORDER — IBUPROFEN 200 MG
600 TABLET ORAL EVERY 6 HOURS
Refills: 0 | Status: DISCONTINUED | OUTPATIENT
Start: 2021-01-17 | End: 2021-01-18

## 2021-01-17 RX ADMIN — Medication 600 MILLIGRAM(S): at 18:23

## 2021-01-17 RX ADMIN — Medication 975 MILLIGRAM(S): at 15:35

## 2021-01-17 RX ADMIN — Medication 975 MILLIGRAM(S): at 03:09

## 2021-01-17 RX ADMIN — Medication 975 MILLIGRAM(S): at 21:30

## 2021-01-17 RX ADMIN — Medication 30 MILLIGRAM(S): at 06:08

## 2021-01-17 RX ADMIN — Medication 30 MILLIGRAM(S): at 00:29

## 2021-01-17 RX ADMIN — Medication 975 MILLIGRAM(S): at 09:03

## 2021-01-17 RX ADMIN — Medication 600 MILLIGRAM(S): at 12:31

## 2021-01-17 RX ADMIN — HEPARIN SODIUM 5000 UNIT(S): 5000 INJECTION INTRAVENOUS; SUBCUTANEOUS at 09:04

## 2021-01-17 RX ADMIN — HEPARIN SODIUM 5000 UNIT(S): 5000 INJECTION INTRAVENOUS; SUBCUTANEOUS at 21:30

## 2021-01-17 RX ADMIN — Medication 1 TABLET(S): at 12:31

## 2021-01-17 RX ADMIN — SIMETHICONE 80 MILLIGRAM(S): 80 TABLET, CHEWABLE ORAL at 09:08

## 2021-01-18 ENCOUNTER — TRANSCRIPTION ENCOUNTER (OUTPATIENT)
Age: 31
End: 2021-01-18

## 2021-01-18 VITALS
TEMPERATURE: 99 F | DIASTOLIC BLOOD PRESSURE: 60 MMHG | HEART RATE: 84 BPM | SYSTOLIC BLOOD PRESSURE: 105 MMHG | RESPIRATION RATE: 18 BRPM | OXYGEN SATURATION: 98 %

## 2021-01-18 LAB — SARS-COV-2 RNA SPEC QL NAA+PROBE: SIGNIFICANT CHANGE UP

## 2021-01-18 PROCEDURE — 85384 FIBRINOGEN ACTIVITY: CPT

## 2021-01-18 PROCEDURE — 88307 TISSUE EXAM BY PATHOLOGIST: CPT

## 2021-01-18 PROCEDURE — 82570 ASSAY OF URINE CREATININE: CPT

## 2021-01-18 PROCEDURE — 85025 COMPLETE CBC W/AUTO DIFF WBC: CPT

## 2021-01-18 PROCEDURE — 59050 FETAL MONITOR W/REPORT: CPT

## 2021-01-18 PROCEDURE — 86850 RBC ANTIBODY SCREEN: CPT

## 2021-01-18 PROCEDURE — 80053 COMPREHEN METABOLIC PANEL: CPT

## 2021-01-18 PROCEDURE — 86900 BLOOD TYPING SEROLOGIC ABO: CPT

## 2021-01-18 PROCEDURE — U0003: CPT

## 2021-01-18 PROCEDURE — C1765: CPT

## 2021-01-18 PROCEDURE — 85730 THROMBOPLASTIN TIME PARTIAL: CPT

## 2021-01-18 PROCEDURE — 59025 FETAL NON-STRESS TEST: CPT

## 2021-01-18 PROCEDURE — 85610 PROTHROMBIN TIME: CPT

## 2021-01-18 PROCEDURE — 86901 BLOOD TYPING SEROLOGIC RH(D): CPT

## 2021-01-18 PROCEDURE — 86769 SARS-COV-2 COVID-19 ANTIBODY: CPT

## 2021-01-18 PROCEDURE — 87340 HEPATITIS B SURFACE AG IA: CPT

## 2021-01-18 PROCEDURE — 84550 ASSAY OF BLOOD/URIC ACID: CPT

## 2021-01-18 PROCEDURE — 81003 URINALYSIS AUTO W/O SCOPE: CPT

## 2021-01-18 PROCEDURE — 83615 LACTATE (LD) (LDH) ENZYME: CPT

## 2021-01-18 PROCEDURE — 86780 TREPONEMA PALLIDUM: CPT

## 2021-01-18 PROCEDURE — U0005: CPT

## 2021-01-18 PROCEDURE — 82962 GLUCOSE BLOOD TEST: CPT

## 2021-01-18 PROCEDURE — 84156 ASSAY OF PROTEIN URINE: CPT

## 2021-01-18 RX ORDER — ACETAMINOPHEN 500 MG
3 TABLET ORAL
Qty: 0 | Refills: 0 | DISCHARGE
Start: 2021-01-18

## 2021-01-18 RX ORDER — IBUPROFEN 200 MG
1 TABLET ORAL
Qty: 0 | Refills: 0 | DISCHARGE
Start: 2021-01-18

## 2021-01-18 RX ORDER — ASPIRIN/CALCIUM CARB/MAGNESIUM 324 MG
1 TABLET ORAL
Qty: 0 | Refills: 0 | DISCHARGE

## 2021-01-18 RX ADMIN — Medication 1 TABLET(S): at 11:18

## 2021-01-18 RX ADMIN — Medication 975 MILLIGRAM(S): at 03:32

## 2021-01-18 RX ADMIN — Medication 600 MILLIGRAM(S): at 11:17

## 2021-01-18 RX ADMIN — Medication 600 MILLIGRAM(S): at 05:55

## 2021-01-18 RX ADMIN — HEPARIN SODIUM 5000 UNIT(S): 5000 INJECTION INTRAVENOUS; SUBCUTANEOUS at 08:28

## 2021-01-18 RX ADMIN — Medication 975 MILLIGRAM(S): at 08:28

## 2021-01-18 RX ADMIN — Medication 600 MILLIGRAM(S): at 00:39

## 2021-01-18 NOTE — DISCHARGE NOTE OB - MEDICATION SUMMARY - MEDICATIONS TO TAKE
I will START or STAY ON the medications listed below when I get home from the hospital:    ibuprofen 600 mg oral tablet  -- 1 tab(s) by mouth every 6 hours  -- Indication: For  delivery delivered    acetaminophen 325 mg oral tablet  -- 3 tab(s) by mouth   -- Indication: For  delivery delivered

## 2021-01-18 NOTE — DISCHARGE NOTE OB - PATIENT PORTAL LINK FT
You can access the FollowMyHealth Patient Portal offered by [facility] by registering at the following website: [email or web address]. By joining [facility]’s FollowMyHealth portal, you will also be able to view your health information using other applications (apps) compatible with our system.

## 2021-01-18 NOTE — DISCHARGE NOTE OB - CARE PLAN
Principal Discharge DX:	 delivery delivered  Goal:	rest and recuperation  Assessment and plan of treatment:	follow up in 2 weeks

## 2021-01-18 NOTE — PROGRESS NOTE ADULT - ASSESSMENT
Assessment and Plan:  POD #2 s/p  section. Patient stable.  S/P Repeat C/S hx of preeclampsia and placenta abruption with previous pregnancy.    Current issues:   1. Patient tested negative for COVID,   tested positive for COVID. Patient re-swabbed for covid this AM- awaiting results

## 2021-01-18 NOTE — DISCHARGE NOTE OB - CARE PROVIDER_API CALL
Marie Deluca)  Obstetrics and Gynecology  [address], [address]  [place] [address]  Phone: [phone]  Fax: [phone]  Follow Up Time:

## 2021-01-18 NOTE — PROGRESS NOTE ADULT - SUBJECTIVE AND OBJECTIVE BOX
Day 1 of Anesthesia Pain Management Service    SUBJECTIVE:  Pain Scale Score:          [X] Refer to charted pain scores    THERAPY:    s/p 0.150 mg PF spinal morphine on 01/16/2021      MEDICATIONS  (STANDING):  acetaminophen   Tablet .. 975 milliGRAM(s) Oral <User Schedule>  diphtheria/tetanus/pertussis (acellular) Vaccine (ADAcel) 0.5 milliLiter(s) IntraMuscular once  heparin   Injectable 5000 Unit(s) SubCutaneous every 12 hours  ibuprofen  Tablet. 600 milliGRAM(s) Oral every 6 hours  ketorolac   Injectable 30 milliGRAM(s) IV Push every 6 hours  lactated ringers. 1000 milliLiter(s) (125 mL/Hr) IV Continuous <Continuous>  nalbuphine Injectable 2 milliGRAM(s) IV Push once  oxytocin Infusion 333.333 milliUNIT(s)/Min (1000 mL/Hr) IV Continuous <Continuous>  oxytocin Infusion 333.333 milliUNIT(s)/Min (1000 mL/Hr) IV Continuous <Continuous>  oxytocin Infusion 333.333 milliUNIT(s)/Min (1000 mL/Hr) IV Continuous <Continuous>  prenatal multivitamin 1 Tablet(s) Oral daily    MEDICATIONS  (PRN):  diphenhydrAMINE 25 milliGRAM(s) Oral every 6 hours PRN Pruritus  HYDROmorphone  Injectable 0.5 milliGRAM(s) IV Push every 3 hours PRN Severe Pain (7 - 10)  lanolin Ointment 1 Application(s) Topical every 6 hours PRN Sore Nipples  magnesium hydroxide Suspension 30 milliLiter(s) Oral two times a day PRN Constipation  naloxone Injectable 0.1 milliGRAM(s) IV Push every 3 minutes PRN For ANY of the following changes in patient status:  A. RR LESS THAN 10 breaths per minute, B. Oxygen saturation LESS THAN 90%, C. Sedation score of 6  ondansetron Injectable 4 milliGRAM(s) IV Push every 6 hours PRN Nausea  oxyCODONE    IR 5 milliGRAM(s) Oral every 3 hours PRN Moderate to Severe Pain (4-10)  oxyCODONE    IR 5 milliGRAM(s) Oral once PRN Moderate to Severe Pain (4-10)  oxyCODONE    IR 5 milliGRAM(s) Oral every 3 hours PRN Mild Pain (1 - 3)  oxyCODONE    IR 10 milliGRAM(s) Oral every 3 hours PRN Moderate Pain (4 - 6)  simethicone 80 milliGRAM(s) Chew every 4 hours PRN Gas      OBJECTIVE:    Sedation:        	[X] Alert	[ ] Drowsy	[ ] Arousable      [ ] Asleep       [ ] Unresponsive    Side Effects:	[X] None	[ ] Nausea	[ ] Vomiting         [ ] Pruritus  		[ ] Weakness            [ ] Numbness	          [ ] Other:    Vital Signs Last 24 Hrs  T(C): 37.3 (17 Jan 2021 09:00), Max: 37.3 (17 Jan 2021 09:00)  T(F): 99.1 (17 Jan 2021 09:00), Max: 99.1 (17 Jan 2021 09:00)  HR: 80 (17 Jan 2021 09:00) (68 - 106)  BP: 105/68 (17 Jan 2021 09:00) (101/62 - 142/76)  BP(mean): 86 (16 Jan 2021 16:00) (75 - 102)  RR: 18 (17 Jan 2021 09:00) (17 - 23)  SpO2: 97% (17 Jan 2021 09:00) (94% - 100%)    ASSESSMENT/ PLAN  [X] Patient transitioned to prn analgesics  [X] Pain management per primary service, pain service to sign off   [X]Documentation and Verification of current medications
Postpartum Note,  Section   ATTENDING NOTE - Post-operative day 1  THOMAS ABURTO  [id number]  30y      Patient is a 30y old  S/P Repeat C/S hx of preeclampsia and placenta abruption with previous pregnancy.  Patient tested negative for COVID,   tested positive for COVID   Subjective:  The patient feels well. Ambulating without difficulty  Pt is tolerating regular diet. Pain well controlled.  She denies nausea and vomiting.  She reports normal postpartum bleeding  +flatus  no headaches, no N/V,no RUQ pain     Physical exam:    Vital Signs Last 24 Hrs  T(C): 37.2 (2021 03:09), Max: 37.3 (2021 09:25)  T(F): 99 (2021 03:09), Max: 99.1 (2021 09:25)  HR: 82 (2021 03:09) (68 - 106)  BP: 101/62 (2021 03:09) (101/62 - 142/76)  BP(mean): 86 (2021 16:00) (75 - 102)  RR: 18 (2021 03:09) (17 - 23)  SpO2: 96% (2021 00:42) (94% - 100%)    21 @ 07:01  -  21 @ 06:54  --------------------------------------------------------  IN: 1000 mL / OUT: 2564 mL / NET: -1564 mL      Physical Exam   Gen: NAD  Breast: Soft, nontender, not engorged.  Abdomen: Soft, nontender, no distension , firm uterine fundus at umbilicus.  Incision: Clean, dry, and intact  Pelvic: Normal lochia noted  Ext: No calf tenderness b/l, noc/c/e      LABS:                        9.8    11.78 )-----------( 169      ( 2021 06:19 )             29.7                         11.0   6.11  )-----------( 183      ( 2021 09:26 )             32.5     ABO Interpretation: A ( @ 09:19)  Rh Interpretation: Positive ( @ 09:19)  Antibody Screen: Negative ( @ 09:19)    21 @ 09:26      137  |  103  |  10  ----------------------------<  101<H>  3.5   |  20<L>  |  0.50        Ca    8.6      2021 09:26    TPro  6.2  /  Alb  3.4  /  TBili  0.1<L>  /  DBili  x   /  AST  25  /  ALT  16  /  AlkPhos  156<H>  21 @ 09:26        Allergies    No Known Allergies    Intolerances      MEDICATIONS  (STANDING):  acetaminophen   Tablet .. 975 milliGRAM(s) Oral <User Schedule>  diphtheria/tetanus/pertussis (acellular) Vaccine (ADAcel) 0.5 milliLiter(s) IntraMuscular once  heparin   Injectable 5000 Unit(s) SubCutaneous every 12 hours  ibuprofen  Tablet. 600 milliGRAM(s) Oral every 6 hours  ketorolac   Injectable 30 milliGRAM(s) IV Push every 6 hours  lactated ringers. 1000 milliLiter(s) (125 mL/Hr) IV Continuous <Continuous>  morphine PF Spinal 0.15 milliGRAM(s) IntraThecal. once  nalbuphine Injectable 2 milliGRAM(s) IV Push once  oxytocin Infusion 333.333 milliUNIT(s)/Min (1000 mL/Hr) IV Continuous <Continuous>  oxytocin Infusion 333.333 milliUNIT(s)/Min (1000 mL/Hr) IV Continuous <Continuous>  oxytocin Infusion 333.333 milliUNIT(s)/Min (1000 mL/Hr) IV Continuous <Continuous>  prenatal multivitamin 1 Tablet(s) Oral daily    MEDICATIONS  (PRN):  diphenhydrAMINE 25 milliGRAM(s) Oral every 6 hours PRN Pruritus  HYDROmorphone  Injectable 0.5 milliGRAM(s) IV Push every 3 hours PRN Severe Pain (7 - 10)  lanolin Ointment 1 Application(s) Topical every 6 hours PRN Sore Nipples  magnesium hydroxide Suspension 30 milliLiter(s) Oral two times a day PRN Constipation  naloxone Injectable 0.1 milliGRAM(s) IV Push every 3 minutes PRN For ANY of the following changes in patient status:  A. RR LESS THAN 10 breaths per minute, B. Oxygen saturation LESS THAN 90%, C. Sedation score of 6  ondansetron Injectable 4 milliGRAM(s) IV Push every 6 hours PRN Nausea  oxyCODONE    IR 5 milliGRAM(s) Oral every 3 hours PRN Moderate to Severe Pain (4-10)  oxyCODONE    IR 5 milliGRAM(s) Oral once PRN Moderate to Severe Pain (4-10)  oxyCODONE    IR 5 milliGRAM(s) Oral every 3 hours PRN Mild Pain (1 - 3)  oxyCODONE    IR 10 milliGRAM(s) Oral every 3 hours PRN Moderate Pain (4 - 6)  simethicone 80 milliGRAM(s) Chew every 4 hours PRN Gas        Assessment and Plan:  POD # 1  s/p  section. Patient stable.  S/P Repeat C/S hx of preeclampsia and placenta abruption with previous pregnancy.  Patient tested negative for COVID,   tested positive for COVID   Encourage ambulation  Continue with PCEA/PCA  Regular diet as tolerated  Follow up CBC    Marie Deluca MD  Office Number [phone]      
Postpartum Note,  Section   ATTENDING NOTE Post-operative day 2  THOMAS ABURTO  [id number]  29y/o s/p repeat c/S Patient's pre-op COVID test was negative, her  tested positive for COVID      Subjective:  The patient feels well.  She is ambulating.   She is tolerating regular diet.  She denies nausea and vomiting.  She is voiding.  Her pain is controlled.  She reports normal postpartum bleeding  Normal lochia  no fevers or SOB  no HA, no N/V, no RUQ pain   Physical exam:    Vital Signs Last 24 Hrs  T(C): 37 (2021 06:01), Max: 37 (2021 21:19)  T(F): 98.6 (2021 06:01), Max: 98.6 (2021 21:19)  HR: 84 (2021 06:01) (81 - 93)  BP: 105/60 (2021 06:01) (105/60 - 117/75)  BP(mean): --  RR: 18 (2021 06:01) (18 - 18)  SpO2: 98% (2021 06:01) (96% - 98%)    Gen: NAD  Breast: Soft, nontender, not engorged.  Abdomen: Soft, nontender, no distension , firm uterine fundus at umbilicus.  Incision: Clean, dry, and intact  Pelvic: Normal lochia noted  Ext: No calf tenderness    LABS:                        9.8    11.78 )-----------( 169      ( 2021 06:19 )             29.7       21 @ 09:26      137  |  103  |  10  ----------------------------<  101<H>  3.5   |  20<L>  |  0.50        Ca    8.6      2021 09:26    TPro  6.2  /  Alb  3.4  /  TBili  0.1<L>  /  DBili  x   /  AST  25  /  ALT  16  /  AlkPhos  156<H>  21 @ 09:26        Allergies    No Known Allergies    Intolerances      MEDICATIONS  (STANDING):  acetaminophen   Tablet .. 975 milliGRAM(s) Oral <User Schedule>  diphtheria/tetanus/pertussis (acellular) Vaccine (ADAcel) 0.5 milliLiter(s) IntraMuscular once  heparin   Injectable 5000 Unit(s) SubCutaneous every 12 hours  ibuprofen  Tablet. 600 milliGRAM(s) Oral every 6 hours  lactated ringers. 1000 milliLiter(s) (125 mL/Hr) IV Continuous <Continuous>  nalbuphine Injectable 2 milliGRAM(s) IV Push once  oxytocin Infusion 333.333 milliUNIT(s)/Min (1000 mL/Hr) IV Continuous <Continuous>  oxytocin Infusion 333.333 milliUNIT(s)/Min (1000 mL/Hr) IV Continuous <Continuous>  oxytocin Infusion 333.333 milliUNIT(s)/Min (1000 mL/Hr) IV Continuous <Continuous>  prenatal multivitamin 1 Tablet(s) Oral daily    MEDICATIONS  (PRN):  diphenhydrAMINE 25 milliGRAM(s) Oral every 6 hours PRN Pruritus  HYDROmorphone  Injectable 0.5 milliGRAM(s) IV Push every 3 hours PRN Severe Pain (7 - 10)  lanolin Ointment 1 Application(s) Topical every 6 hours PRN Sore Nipples  magnesium hydroxide Suspension 30 milliLiter(s) Oral two times a day PRN Constipation  naloxone Injectable 0.1 milliGRAM(s) IV Push every 3 minutes PRN For ANY of the following changes in patient status:  A. RR LESS THAN 10 breaths per minute, B. Oxygen saturation LESS THAN 90%, C. Sedation score of 6  ondansetron Injectable 4 milliGRAM(s) IV Push every 6 hours PRN Nausea  oxyCODONE    IR 5 milliGRAM(s) Oral every 3 hours PRN Moderate to Severe Pain (4-10)  oxyCODONE    IR 5 milliGRAM(s) Oral once PRN Moderate to Severe Pain (4-10)  oxyCODONE    IR 5 milliGRAM(s) Oral every 3 hours PRN Mild Pain (1 - 3)  oxyCODONE    IR 10 milliGRAM(s) Oral every 3 hours PRN Moderate Pain (4 - 6)  simethicone 80 milliGRAM(s) Chew every 4 hours PRN Gas          A/P: 29y/o s/p repeat C/S Patient's pre-op COVID test was negative, her  tested positive for COVID  POD # 2  s/p  section. Patient stable.  Encourage ambulation  Analgesia prn Motrin and tylenol  Regular diet as tolerated  Repeat COVID test today, advised quarrantine at home   F/u 2 wks in the office/Dr.Lazar Marie Deluca MD   Physician contact/office  number [phone]        
C/Sec Note POD#2    Patient is a 30y old  S/P Repeat C/S hx of preeclampsia and placenta abruption with previous pregnancy.  Patient tested negative for COVID,   tested positive for COVID   Subjective:  The patient feels well. Ambulating without difficulty  Pt is tolerating regular diet. Pain well controlled.  She denies nausea and vomiting.  She reports normal postpartum bleeding  +flatus  no headaches, no N/V,no RUQ pain     Physical exam:  Vital Signs Last 24 Hrs  T(C): 37 (18 Jan 2021 06:01), Max: 37.3 (17 Jan 2021 09:00)  T(F): 98.6 (18 Jan 2021 06:01), Max: 99.1 (17 Jan 2021 09:00)  HR: 84 (18 Jan 2021 06:01) (80 - 93)  BP: 105/60 (18 Jan 2021 06:01) (105/60 - 117/75)  BP(mean): --  RR: 18 (18 Jan 2021 06:01) (18 - 18)  SpO2: 98% (18 Jan 2021 06:01) (96% - 98%)    Physical Exam   Gen: NAD  Abdomen: Soft, nontender, no distension , firm uterine fundus at umbilicus.  Incision: Clean, dry, and intact- Prineo dermabond tape  Pelvic: Normal lochia noted  Ext: soft, NT b/l      LABS:                        9.8    11.78 )-----------( 169      ( 17 Jan 2021 06:19 )             29.7                         11.0   6.11  )-----------( 183      ( 16 Jan 2021 09:26 )             32.5     ABO Interpretation: A (01-16 @ 09:19)  Rh Interpretation: Positive (01-16 @ 09:19)  Antibody Screen: Negative (01-16 @ 09:19)    01-16-21 @ 09:26      137  |  103  |  10  ----------------------------<  101<H>  3.5   |  20<L>  |  0.50        Ca    8.6      16 Jan 2021 09:26    TPro  6.2  /  Alb  3.4  /  TBili  0.1<L>  /  DBili  x   /  AST  25  /  ALT  16  /  AlkPhos  156<H>  01-16-21 @ 09:26        Allergies    No Known Allergies

## 2021-01-21 PROBLEM — Z78.9 OTHER SPECIFIED HEALTH STATUS: Chronic | Status: ACTIVE | Noted: 2021-01-07

## 2021-01-23 LAB — SURGICAL PATHOLOGY STUDY: SIGNIFICANT CHANGE UP

## 2021-01-26 ENCOUNTER — APPOINTMENT (OUTPATIENT)
Dept: OBGYN | Facility: CLINIC | Age: 31
End: 2021-01-26
Payer: COMMERCIAL

## 2021-01-26 VITALS — BODY MASS INDEX: 25.5 KG/M2 | SYSTOLIC BLOOD PRESSURE: 110 MMHG | WEIGHT: 158 LBS | DIASTOLIC BLOOD PRESSURE: 60 MMHG

## 2021-01-26 PROCEDURE — 99213 OFFICE O/P EST LOW 20 MIN: CPT

## 2021-01-26 PROCEDURE — 99072 ADDL SUPL MATRL&STAF TM PHE: CPT

## 2021-01-26 RX ORDER — NORGESTIMATE AND ETHINYL ESTRADIOL 7DAYSX3 28
0.18/0.215/0.25 KIT ORAL
Qty: 3 | Refills: 1 | Status: ACTIVE | COMMUNITY
Start: 2021-01-26 | End: 1900-01-01

## 2021-01-26 NOTE — HISTORY OF PRESENT ILLNESS
[Postpartum Follow Up] : postpartum follow up [Delivery Date: ___] : on [unfilled] [Repeat C/S] : delivered by  section (repeat) [Female] : Delivery History: baby girl [Wt. ___] : weighing [unfilled] [Pertussis Vaccine] : Pertussis vaccine administered [Clean/Dry/Intact] : clean, dry and intact [Healed] : healed [Back to Normal] : is back to normal in size [Normal] : the vagina was normal [Not Done] : Examination of breasts not done [Doing Well] : is doing well [No Sign of Infection] : is showing no signs of infection [Excellent Pain Control] : has excellent pain control [None] : None [Complications:___] : no complications [Rhogam] : Rhogam was not administered [Rubella Vaccine] : Rubella vaccine was not administered [BTL] : no tubal ligation [Breastfeeding] : not currently nursing [BF with Difficulty] : nursing without difficulty [Resumed Menses] : has not resumed her menses [Resumed [place]] : has not resumed intercourse [S/Sx PP Depression] : no signs/symptoms of postpartum depression [Erythema] : not erythematous [Swelling] : not swollen [Dehiscence] : not dehisced [Cervix Sample Taken] : cervical sample not taken for a Pap smear [FreeTextEntry8] : s/p elective repeat C/s 1/16 Ines 7.56 lbs; Similac Advanced Pro; Wound without complaints, Lochia gone; Moods OK

## 2021-03-12 ENCOUNTER — APPOINTMENT (OUTPATIENT)
Dept: OBGYN | Facility: CLINIC | Age: 31
End: 2021-03-12
Payer: COMMERCIAL

## 2021-03-12 VITALS
BODY MASS INDEX: 25.18 KG/M2 | SYSTOLIC BLOOD PRESSURE: 110 MMHG | TEMPERATURE: 96.9 F | DIASTOLIC BLOOD PRESSURE: 62 MMHG | WEIGHT: 156 LBS

## 2021-03-12 DIAGNOSIS — Z20.822 CONTACT WITH AND (SUSPECTED) EXPOSURE TO COVID-19: ICD-10-CM

## 2021-03-12 DIAGNOSIS — Z30.9 ENCOUNTER FOR CONTRACEPTIVE MANAGEMENT, UNSPECIFIED: ICD-10-CM

## 2021-03-12 PROCEDURE — 99213 OFFICE O/P EST LOW 20 MIN: CPT

## 2021-03-12 PROCEDURE — 99072 ADDL SUPL MATRL&STAF TM PHE: CPT

## 2021-03-12 NOTE — HISTORY OF PRESENT ILLNESS
[Postpartum Follow Up] : postpartum follow up [Complications:___] : complications include: [unfilled] [Delivery Date: ___] : on [unfilled] [Repeat C/S] : delivered by  section (repeat) [Female] : Delivery History: baby girl [Resumed Menses] : has resumed her menses [Intended Contraception] : Intended Contraception: [Oral Contraceptives] : oral contraceptives [Clean/Dry/Intact] : clean, dry and intact [Back to Normal] : is back to normal in size [None] : no vaginal bleeding [Normal] : the vagina was normal [Examination Of The Breasts] : breasts are normal [Doing Well] : is doing well [Breastfeeding] : not currently nursing [Resumed [place]] : has not resumed intercourse [S/Sx PP Depression] : no signs/symptoms of postpartum depression [FreeTextEntry8] :  had quarrantine detected on screen for L/D,  quarrantined for 10 days, Patinet denies HA, no N/V, no RUQ pain, no fevers or SOB  [de-identified] : R/B discussed including but not limited to DVT, benign liver tumors  [de-identified] : Pap with cotesting (last Pap 2015) Hx of preeclampsia, asymptomatic, BP WNL, not on antihyerpertensived, discussed OCP f/u 1 year

## 2021-03-14 LAB
HPV HIGH+LOW RISK DNA PNL CVX: NOT DETECTED
SARS-COV-2 IGG SERPL IA-ACNC: 0.11 INDEX
SARS-COV-2 IGG SERPL QL IA: NEGATIVE

## 2021-03-19 LAB — CYTOLOGY CVX/VAG DOC THIN PREP: NORMAL

## 2022-02-28 ENCOUNTER — RX RENEWAL (OUTPATIENT)
Age: 32
End: 2022-02-28

## 2022-03-21 NOTE — OB PST NOTE - FALL HARM RISK TYPE OF ASSESSMENT
Continue to monitor clinically for any rashes/petechiae    monitor with daily  CBC and platelets Admission

## 2022-04-11 PROBLEM — Z11.59 SCREENING FOR VIRAL DISEASE: Status: RESOLVED | Noted: 2017-02-21 | Resolved: 2022-04-11

## 2022-04-11 PROBLEM — Z11.59 SCREENING FOR VIRAL DISEASE: Status: ACTIVE | Noted: 2020-06-30

## 2022-04-16 ENCOUNTER — TRANSCRIPTION ENCOUNTER (OUTPATIENT)
Age: 32
End: 2022-04-16

## 2022-05-03 NOTE — DISCHARGE NOTE OB - AVOID DOUCHING OR TAMPONS UNTIL YOUR POSTPARTUM VISIT
This is a historical note converted from Juan R. Formatting and pictures may have been removed.  Please reference Juan R for original formatting and attached multimedia. Chief Complaint  1 month follow up  History of Present Illness  85 y/o female here for follow up for RLS  here with her daughter  says symptoms have been worse lately, and during the day  symptoms only at rest  improved with walking  has been weaning pramipexole, says she has taken this on and off for the past 10 years, does not work anymore  recetnly started gabapentin, denies severe drowsiness  discussed increased dose  ?   seeing dr. lopez upcoming- osteoporosis, has not been taking forteo since jan  ?   upcoming radiology visit- adenocarcinoma lung  ?   completed PT goals for neck pain  ?  ?  ?   7/21  female with COPD here for sinus congestion, cough x 4 days  denies acute shortness of breath  denies fever  denies NVD  using inhalers  no known sick contacts  says mucous is thick and greenish in color  ?  ?  ?  6/21  female here for follow up.  seeing PT for neck pain  taking baclofen prn  recent CT with pulm- dr. Mcintosh  cardiology q 6 months- Dr. john  says pramipexole is no longer helping RLS- says she has taken this medication for more than 5 years  ?  ?  ?  4/21  female here for follow up.  adenocarcinoma lung- had raditation, has scheduled follow up next week for repeat CT  Dr. lopez- lary john- cardiology  quit smoking 20 years ago  c/o right leg swelling and redness  also neck pain and stiffness- DDD cervical, willing to try PT- has been taking muscle relaxer prn  ?  ?  12/20  female here for wellness  new diagnosis- adenocarcinoma of lung  has PET scan scheduled this week- Dr. Rebeca Lopez of endo- forteo  had flu vaccine this season  has most labs checked with endo  says RLS medication not working as well as it did in the past, not wanting to change medication at this time  ?  ?  ?  ?  ?  ?  9/20  female here for  left neck pain that has been chronic and intermittent  says the pain is wore at night.  did see Dr. sloan in the past for headaches. said she would get injections in her her neck for the headaches.  denies current headaches.  says it fells like a muscle spasm in her left neck  daily spasm for over a year, progressively worsening  no previous injury  takes tylenol and topical rubs  noted on chart review she is precribed baclofen, takes nightly  ?  dr. collins ENT  Dr. Key- GI  Dr. james- cardiology  Dr. Lopez- endo  ?  ?  ?  ?    female here for wellness  sees dr. lopez- she says he stopped reclast and monitors sugar.  Dr. john -once a year  Dr. key- GI  mammogram- Dr. shankar  quit smoking 2 years ago.  she rick some walking for exercise  her sons live next door.  she cooks and cleans but no longer drives  ?  ?  Dr. Escamilla hx;  HEENT -?Dr. Hatch ?eye exam s/p cataracts  ???? Dr. Sloan prn - headaches/RLS/Neuropathy  CHEST? - COPD/former smoker  C/V - Dr. John-glenly/Coley-prn ??- decreased left radial pulse/SVT/Hyperchol  GI? - Dr Key colonoscope polyp - repeat prn  ???? EGD -  esophagitis/gastritis - yrly??????? s/p gastrectomy-ulcer  ????? 10/10/18 Diverticulitis   - nocturia  M/S - Dr. Zavala - s/p bilat tot. knees?? 2016 Fx ankle  HYPOGLYCEMIA - Dr. John Lopez  GYN - Dr. Shankar - heladio?- ??? Left ovary lesion -tests neg  OSTEOPOROSIS - Dr. John Lopez q 6? mos - Reclast  VITAMIN D DEF.  Review of Systems  Constitutional: No fever, No chills, No sweats, No weakness, No fatigue, No decreased activity, no weight changes  ?????Eye: No blurring, No double vision.  ?????Ear/Nose/Mouth/Throat: No nasal congestion, No sore throat.  ?????Respiratory:?lung cancer  ?????Cardiovascular: No chest pain, No palpitations, No bradycardia, No tachycardia, No peripheral edema.  ?????Gastrointestinal: No nausea, No vomiting, No diarrhea, No constipation, No abdominal  pain.  ?????Genitourinary: No dysuria, No hematuria.  ?????Musculoskeletal:?neck pain improved  ?????Integumentary: No rash.  ?????Neurologic: RLS  ?????Psychiatric: No anxiety, No depression.  Physical Exam  Vitals & Measurements  HR:?85(Peripheral)? BP:?136/70? SpO2:?100%?  HT:?158?cm? HT:?158.00?cm? WT:?68.5?kg? WT:?68.500?kg? BMI:?27.44?  ????General: Alert and oriented, No acute distress.  ?????Eye: Extraocular movements are intact, Normal conjunctiva.  ?????HENT: Normocephalic  ???? Respiratory: Lungs are clear to auscultation, Respirations are non-labored, Breath sounds are equal, Symmetrical chest wall expansion, No chest wall tenderness.  ?????Cardiovascular: Normal rate, Regular rhythm, No edema.  ?????Gastrointestinal: Soft, Non-tender, Non-distende  ?????Musculoskeletal: Normal range of motion, Normal gait.  ?????Integumentary: Warm, Dry, Intact.  ?????Neurologic: Alert, Oriented, No focal deficits.  ?????Psychiatric: Cooperative, Appropriate mood & affect  Assessment/Plan  1.?HTN - Hypertension?I10  Ordered:  Basic Metabolic Panel, Routine collect, 08/03/21 10:39:00 CDT, Blood, Stop date 08/03/21 10:39:00 CDT, Lab Collect, HTN - Hypertension, 08/03/21 10:39:00 CDT  ?  2.?Restless leg syndrome?G25.81  Ordered:  External Referral, restless leg, neurology, 08/03/21 10:18:00 CDT, Restless leg syndrome  Ferritin, Routine collect, 08/03/21 10:20:00 CDT, Blood, Order for future visit, Stop date 08/03/21 10:20:00 CDT, Lab Collect, Restless leg syndrome, 08/03/21 10:20:00 CDT  ?  3.?Adenocarcinoma of lung?C34.90  ?  4.?Lung mass?R91.8  ?  5.?Emphysema/COPD?J43.9  ?  6.?Former smoker?Z87.891  ?  7.?History of osteoporosis?Z87.39  ?  Orders:  gabapentin, 300 mg = 1 cap(s), Oral, Once a day (at bedtime), # 90 cap(s), 0 Refill(s), Pharmacy: [facility], 158, cm, Height/Length Dosing, 08/03/21 9:47:00 CDT, 68.5, kg, Weight Dosing, 08/03/21 9:56:00 CDT  gabapentin, 100 mg = 1 cap(s), Oral, Daily,  100mg daily and 300mg at bedtime, # 30 cap(s), 0 Refill(s), other reason (Rx)  increase gabapentin to 300mg bedtime  100 mg during the day  continue wean pramipexole 0.375mg now  referral to neuro for further eval of worsening chronic condition- RLS  upcoming endo apt  upcoming radiology apt- PET  pt to call within 2 weeks for update on RLS symptoms with change in medication  lab collection today  wellness scheduled Dec  Referrals  External Referral, restless leg, neurology, 08/03/21 10:18:00 CDT, Restless leg syndrome   Problem List/Past Medical History  Ongoing  Adenocarcinoma of lung  Colon polyp  COPD - Chronic obstructive pulmonary disease  Decreased radial pulse  Diverticulosis of intestine  Emphysema  Emphysema/COPD  Former smoker  Gastroparesis  GERD - Gastro-esophageal reflux disease  History of osteoporosis  HLD - Hyperlipidemia  HTN - Hypertension  Hypercholesteremia  Hypoglycemia  Hypoglycemia  Lung mass  Neuropathy  Neuropathy  Osteoporosis  Polyp colon  Restless leg  Restless legs syndrome  SVT (supraventricular tachycardia)  SVT - Supraventricular tachycardia  Vitamin D deficiency  Historical  Diverticulitis  Procedure/Surgical History  Drainage of Right Pleural Cavity with Drainage Device, Percutaneous Approach (12/03/2020)  Excision of Right Lower Lung Lobe, Percutaneous Approach, Diagnostic (12/03/2020)  Right Lung Biopsy (12/03/2020)  Colonoscopy (04/02/2014)  Right Total knee replacement (06.2013)  Left Total knee replacement (07/17/2012)  Arthroscopy of knee (2012)  Cardiac Ablation (08.2006)  Partial Gastrectomy (1983)  Appendectomy  Bilateral extraction of cataracts  Cholecystectomy  Excision of cervical rib  Excision of meniscus of knee  Iodine-goiter prophylaxis  Release of trigger thumb   Medications  Anoro Ellipta 62.5 mcg-25 mcg/inh inhalation powder, 1 puff(s), INH, Daily, 3 refills,? ?Not taking  baclofen 5 mg oral tablet, 5 mg= 1 tab(s), Oral, TID  Caltrate 600 + D oral tablet, 1  tab(s), Oral, BID  CYANOCOBALAMIN 1,000 MCG/ML  famotidine 40 mg oral tablet, 40 mg= 1 tab(s), Oral, Once a day (at bedtime)  Forteo 600 mcg/2.4 mL subcutaneous solution, 20 mcg, Subcutaneous, Daily  furosemide 20 mg oral tablet, 20 mg= 1 tab(s), Oral, Daily  gabapentin 100 mg oral capsule, 100 mg= 1 cap(s), Oral, Daily  gabapentin 300 mg oral capsule, 300 mg= 1 cap(s), Oral, Once a day (at bedtime)  glucose 4 g oral tablet, chewable, 16 gm= 4 tab(s), Chewed, Once, PRN  metoprolol succinate 50 mg oral tablet, extended release, 50 mg= 1 tab(s), Oral, Daily  Multivitamins and Minerals oral tablet  omeprazole 20 mg oral DR capsule, 20 mg= 1 cap(s), Oral, Daily  PRAVASTATIN SODIUM 40 MG TAB, 40 mg= 1 tab(s), Oral, Once a day (at bedtime)  Stiolto Respimat 60 ACT 2.5 mcg-2.5 mcg/inh inhalation aerosol, 2 puff(s), INH, q24hr, 3 refills  Allergies  Darvon?(Unknown)  Demerol?(Unknown)  Talwin?(Unknown)  aspirin?(Unknown)  clindamycin?(Unknown)  codeine?(unknown)  penicillin?(Unknown)  traMADol?(Unknown)  Social History  Abuse/Neglect  No, 08/03/2021  No, 07/01/2021  No, 12/17/2019  Alcohol  Never, 12/03/2020  Employment/School  Retired, 08/22/2018  Exercise  Exercise type: YARDWORK., 09/24/2018  Home/Environment  Lives with Alone., 09/24/2018  Spiritual/Cultural  Baptism, 12/03/2020  Substance Use  Never, 12/03/2020  Tobacco  Former smoker, quit more than 30 days ago, No, 08/03/2021  Former smoker, quit more than 30 days ago, No, 07/01/2021  Family History  Aneurysm: Mother.  Diabetes mellitus: Brother.  Hypertension: Brother.  Lung cancer: Father.  Pacemaker pulse generator, device: Brother.  Stroke: Mother.  Uterine cancer: Mother.  Immunizations  Vaccine Date Status   COVID-19 mRNA, LNP-S, PF - Moderna 03/09/2021 Recorded   COVID-19 mRNA, DANAP-S, PF - Moderna 02/09/2021 Recorded   influenza virus vaccine, inactivated 10/19/2020 Given   influenza virus vaccine, inactivated 11/04/2019 Recorded   influenza virus vaccine,  inactivated 09/24/2018 Given   tetanus-diphtheria toxoids 09/24/2018 Given   pneumococcal 13-valent conjugate vaccine 03/24/2014 Recorded   pneumococcal 23-polyvalent vaccine 10/28/2008 Recorded   Health Maintenance  Health Maintenance  ???Pending?(in the next year)  ??? ??OverDue  ??? ? ? ?Advance Directive due??01/02/21??and every 1??year(s)  ??? ? ? ?Cognitive Screening due??01/02/21??and every 1??year(s)  ??? ??Due?  ??? ? ? ?COPD Maintenance-Pulmonary Rehab Education due??08/03/21??and every 1??year(s)  ??? ? ? ?Zoster Vaccine due??08/03/21??Unknown Frequency  ??? ??Due In Future?  ??? ? ? ?Influenza Vaccine not due until??10/01/21??and every 1??day(s)  ??? ? ? ?Depression Screening not due until??12/21/21??and every 1??year(s)  ??? ? ? ?ADL Screening not due until??12/21/21??and every 1??year(s)  ??? ? ? ?Medicare Annual Wellness Exam not due until??12/21/21??and every 1??year(s)  ??? ? ? ?Hypertension Management-Education not due until??12/21/21??and every 1??year(s)  ??? ? ? ?Obesity Screening not due until??01/01/22??and every 1??year(s)  ??? ? ? ?Fall Risk Assessment not due until??01/02/22??and every 1??year(s)  ??? ? ? ?Functional Assessment not due until??01/02/22??and every 1??year(s)  ??? ? ? ?Hypertension Management-BMP not due until??04/21/22??and every 1??year(s)  ??? ? ? ?COPD Management-COPD Medications Prescribed not due until??06/23/22??and every 1??year(s)  ???Satisfied?(in the past 1 year)  ??? ??Satisfied?  ??? ? ? ?ADL Screening on??12/21/20.??Satisfied by Marielle Paige MD  ??? ? ? ?Advance Directive on??12/03/20.??Satisfied by Carmelo MANE, Noemy Rene  ??? ? ? ?Blood Pressure Screening on??08/03/21.??Satisfied by Ronna Smyth CMA  ??? ? ? ?Body Mass Index Check on??08/03/21.??Satisfied by Ronna Smyth CMA  ??? ? ? ?COPD Maintenance-Spirometry on??11/23/20.??Satisfied by Kimberly Solares  ??? ? ? ?COPD Management-Oxygen Assessment on??08/03/21.??Satisfied by Ronna Smyth CMA  DAWN  ??? ? ? ?COPD Management-COPD Medications Prescribed on??06/23/21.??Satisfied by Marielle Paige MD  ??? ? ? ?Cognitive Screening on??12/21/20.??Satisfied by Marielle Paige MD  ??? ? ? ?Depression Screening on??12/21/20.??Satisfied by Marielle Paige MD  ??? ? ? ?Diabetes Screening on??02/25/21.??Satisfied by Deidra Valles LPN  ??? ? ? ?Fall Risk Assessment on??08/03/21.??Satisfied by Ronna Smyth CMA  ??? ? ? ?Functional Assessment on??06/23/21.??Satisfied by Marielle Paige MD  ??? ? ? ?Hypertension Management-Blood Pressure on??08/03/21.??Satisfied by Ronna Smyth CMA  ??? ? ? ?Influenza Vaccine on??10/19/20.??Satisfied by Ronna Smyth CMA  ??? ? ? ?Lipid Screening on??02/25/21.??Satisfied by Deidra Valles LPN  ??? ? ? ?Medicare Annual Wellness Exam on??12/21/20.??Satisfied by Marielle Paige MD  ??? ? ? ?Obesity Screening on??08/03/21.??Satisfied by Ronna Smyth CMA  ?      Statement Selected

## 2022-08-03 NOTE — PRE-ANESTHESIA EVALUATION ADULT - NS MD HP INPLANTS MED DEV
[8] : 8 [5] : 5 None [Burning] : burning [Dull/Aching] : dull/aching [Constant] : constant [Rest] : rest [Standing] : standing [Walking] : walking [de-identified] : 08/03/2022: Pt states she started getting foot pain after walking excessively 2 weeks ago. Went to her pcp who sent her for Xrays. Walking in regular shoes. no prior foot probs. no dm/tob.  [] : Post Surgical Visit: no [FreeTextEntry1] : LT foot [FreeTextEntry9] : elevate  [de-identified] : PCP [de-identified] : Xray

## 2022-10-04 ENCOUNTER — APPOINTMENT (OUTPATIENT)
Dept: OBGYN | Facility: CLINIC | Age: 32
End: 2022-10-04

## 2022-10-04 VITALS
SYSTOLIC BLOOD PRESSURE: 114 MMHG | HEIGHT: 66 IN | DIASTOLIC BLOOD PRESSURE: 72 MMHG | BODY MASS INDEX: 24.75 KG/M2 | WEIGHT: 154 LBS

## 2022-10-04 DIAGNOSIS — R92.2 INCONCLUSIVE MAMMOGRAM: ICD-10-CM

## 2022-10-04 DIAGNOSIS — G44.009 CLUSTER HEADACHE SYNDROME, UNSPECIFIED, NOT INTRACTABLE: ICD-10-CM

## 2022-10-04 DIAGNOSIS — N64.4 MASTODYNIA: ICD-10-CM

## 2022-10-04 DIAGNOSIS — Z01.419 ENCOUNTER FOR GYNECOLOGICAL EXAMINATION (GENERAL) (ROUTINE) W/OUT ABNORMAL FINDINGS: ICD-10-CM

## 2022-10-04 PROCEDURE — 99395 PREV VISIT EST AGE 18-39: CPT

## 2022-10-04 NOTE — DISCUSSION/SUMMARY
Statement Selected [FreeTextEntry1] : 32-year-old female P2 LMP 2 wks ago \par Annual exam\par Pap 2021\par Breast pain referred for breast imaging\par History of preeclampsia experiencing headaches refer to cardiology and neurology, BP WNL today \par Patient on OCPs for contraception, discussed contraceptive options recommend Mirena IUD\par Risks and benefits discussed including but not limited to ectopic pregnancy and PID\par Prescription for 3 months of OCP sent until Mirena IUD is inserted\par Patient to follow-up in 2 to 4 weeks for IUD insertion\par

## 2022-10-04 NOTE — HISTORY OF PRESENT ILLNESS
[FreeTextEntry1] : 32-year-old female P2 LMP 2 wks ago \par Annual exam\par Pap 2021\par Patient complaining of headaches 3 times this week took Excedrin better today currently on OCPs for contraception\par History of preeclampsia and placental abruption\par Patient considering conceiving next year\par Patient complaining of right breast pain has seen a specialist in the past

## 2023-03-15 NOTE — ED ADULT TRIAGE NOTE - AS TEMP SITE
Juan Cisneros was seen and treated in our emergency department on 3/15/2023. To whom it may concern,    This patient and her siblings have been diagnosed with a viral illness. It would behoove them to adhere to quarantine protocols to prevent spread to other children/people until they are fever free for 24 hours. I hope this helps, if you have questions please call [facility] emergency department for clarification.     Jono Lara, APRN - CNP
oral

## 2023-10-13 ENCOUNTER — RX RENEWAL (OUTPATIENT)
Age: 33
End: 2023-10-13

## 2023-10-13 RX ORDER — NORGESTIMATE AND ETHINYL ESTRADIOL 7DAYSX3 LO
0.18/0.215/0.25 KIT ORAL
Qty: 84 | Refills: 3 | Status: ACTIVE | COMMUNITY
Start: 2021-03-12 | End: 1900-01-01

## 2023-11-13 ENCOUNTER — NON-APPOINTMENT (OUTPATIENT)
Age: 33
End: 2023-11-13

## 2024-01-18 ENCOUNTER — NON-APPOINTMENT (OUTPATIENT)
Age: 34
End: 2024-01-18

## 2024-08-24 ENCOUNTER — APPOINTMENT (OUTPATIENT)
Dept: RADIOLOGY | Facility: CLINIC | Age: 34
End: 2024-08-24

## 2024-08-24 PROCEDURE — 73130 X-RAY EXAM OF HAND: CPT | Mod: RT

## 2024-08-24 PROCEDURE — 73110 X-RAY EXAM OF WRIST: CPT | Mod: RT

## 2025-03-18 ENCOUNTER — APPOINTMENT (OUTPATIENT)
Dept: OBGYN | Facility: CLINIC | Age: 35
End: 2025-03-18
Payer: COMMERCIAL

## 2025-03-18 DIAGNOSIS — Z32.01 ENCOUNTER FOR PREGNANCY TEST, RESULT POSITIVE: ICD-10-CM

## 2025-03-18 PROCEDURE — 99214 OFFICE O/P EST MOD 30 MIN: CPT | Mod: 25

## 2025-03-18 PROCEDURE — 76817 TRANSVAGINAL US OBSTETRIC: CPT

## 2025-03-20 ENCOUNTER — NON-APPOINTMENT (OUTPATIENT)
Age: 35
End: 2025-03-20

## 2025-03-20 LAB
B19V IGG SER QL IA: 3.54 INDEX
B19V IGG+IGM SER-IMP: NORMAL
B19V IGG+IGM SER-IMP: POSITIVE
B19V IGM FLD-ACNC: 0.17 INDEX
B19V IGM SER-ACNC: NEGATIVE
HPV HIGH+LOW RISK DNA PNL CVX: NOT DETECTED

## 2025-03-22 LAB — CYTOLOGY CVX/VAG DOC THIN PREP: NORMAL

## 2025-04-01 ENCOUNTER — APPOINTMENT (OUTPATIENT)
Dept: OBGYN | Facility: CLINIC | Age: 35
End: 2025-04-01
Payer: COMMERCIAL

## 2025-04-01 VITALS
SYSTOLIC BLOOD PRESSURE: 132 MMHG | DIASTOLIC BLOOD PRESSURE: 76 MMHG | BODY MASS INDEX: 26.96 KG/M2 | WEIGHT: 167.03 LBS

## 2025-04-01 VITALS
DIASTOLIC BLOOD PRESSURE: 76 MMHG | SYSTOLIC BLOOD PRESSURE: 132 MMHG | WEIGHT: 167 LBS | BODY MASS INDEX: 26.84 KG/M2 | HEIGHT: 66 IN

## 2025-04-01 DIAGNOSIS — Z34.91 ENCOUNTER FOR SUPERVISION OF NORMAL PREGNANCY, UNSPECIFIED, FIRST TRIMESTER: ICD-10-CM

## 2025-04-01 PROCEDURE — 99215 OFFICE O/P EST HI 40 MIN: CPT | Mod: 25

## 2025-04-02 LAB
ABORH: NORMAL
ANTIBODY SCREEN: NORMAL
BASOPHILS # BLD AUTO: 0.02 K/UL
BASOPHILS NFR BLD AUTO: 0.3 %
CMV IGG SERPL QL: <0.2 U/ML
CMV IGG SERPL-IMP: NEGATIVE
CMV IGM SERPL QL: <8 AU/ML
CMV IGM SERPL QL: NEGATIVE
EOSINOPHIL # BLD AUTO: 0.04 K/UL
EOSINOPHIL NFR BLD AUTO: 0.7 %
ESTIMATED AVERAGE GLUCOSE: 111 MG/DL
HBA1C MFR BLD HPLC: 5.5 %
HBV SURFACE AG SER QL: NONREACTIVE
HCT VFR BLD CALC: 33 %
HCV AB SER QL: NONREACTIVE
HCV S/CO RATIO: 0.32 S/CO
HGB A MFR BLD: 97.4 %
HGB A2 MFR BLD: 2.6 %
HGB BLD-MCNC: 10.6 G/DL
HGB FRACT BLD-IMP: NORMAL
HIV1+2 AB SPEC QL IA.RAPID: NONREACTIVE
IMM GRANULOCYTES NFR BLD AUTO: 0.3 %
LYMPHOCYTES # BLD AUTO: 1.49 K/UL
LYMPHOCYTES NFR BLD AUTO: 25.9 %
MAN DIFF?: NORMAL
MCHC RBC-ENTMCNC: 30.7 PG
MCHC RBC-ENTMCNC: 32.1 G/DL
MCV RBC AUTO: 95.7 FL
MEV IGG FLD QL IA: 44.5 AU/ML
MEV IGG+IGM SER-IMP: POSITIVE
MONOCYTES # BLD AUTO: 0.53 K/UL
MONOCYTES NFR BLD AUTO: 9.2 %
NEUTROPHILS # BLD AUTO: 3.66 K/UL
NEUTROPHILS NFR BLD AUTO: 63.6 %
PLATELET # BLD AUTO: 227 K/UL
RBC # BLD: 3.45 M/UL
RBC # FLD: 13.4 %
RUBV IGG FLD-ACNC: 1.7 INDEX
RUBV IGG SER-IMP: POSITIVE
T PALLIDUM AB SER QL IA: NEGATIVE
TSH SERPL-ACNC: 0.31 UIU/ML
VZV AB TITR SER: POSITIVE
VZV IGG SER IF-ACNC: 9.18 S/CO
WBC # FLD AUTO: 5.76 K/UL

## 2025-04-08 ENCOUNTER — NON-APPOINTMENT (OUTPATIENT)
Age: 35
End: 2025-04-08

## 2025-04-09 LAB
AR GENE MUT ANL BLD/T: NORMAL
B19V IGG SER QL IA: 3.58 INDEX
B19V IGG+IGM SER-IMP: NORMAL
B19V IGG+IGM SER-IMP: POSITIVE
B19V IGM FLD-ACNC: 0.17 INDEX
B19V IGM SER-ACNC: NEGATIVE
CFTR MUT TESTED BLD/T: NEGATIVE
FMR1 GENE MUT ANL BLD/T: NORMAL
LEAD BLD-MCNC: <1 UG/DL
MEV IGM SER QL: 0.42 AU

## 2025-04-11 ENCOUNTER — ASOB RESULT (OUTPATIENT)
Age: 35
End: 2025-04-11

## 2025-04-11 ENCOUNTER — APPOINTMENT (OUTPATIENT)
Dept: ANTEPARTUM | Facility: CLINIC | Age: 35
End: 2025-04-11

## 2025-04-11 DIAGNOSIS — O35.8XX0 MATERNAL CARE FOR OTHER (SUSPECTED) FETAL ABNORMALITY AND DAMAGE, NOT APPLICABLE OR UNSPECIFIED: ICD-10-CM

## 2025-04-11 PROCEDURE — 76813 OB US NUCHAL MEAS 1 GEST: CPT

## 2025-04-11 PROCEDURE — 76801 OB US < 14 WKS SINGLE FETUS: CPT | Mod: 59

## 2025-04-17 ENCOUNTER — TRANSCRIPTION ENCOUNTER (OUTPATIENT)
Age: 35
End: 2025-04-17

## 2025-04-30 ENCOUNTER — NON-APPOINTMENT (OUTPATIENT)
Age: 35
End: 2025-04-30

## 2025-05-01 ENCOUNTER — APPOINTMENT (OUTPATIENT)
Dept: OBGYN | Facility: CLINIC | Age: 35
End: 2025-05-01
Payer: COMMERCIAL

## 2025-05-01 VITALS
HEIGHT: 66 IN | BODY MASS INDEX: 26.38 KG/M2 | WEIGHT: 164.13 LBS | SYSTOLIC BLOOD PRESSURE: 105 MMHG | DIASTOLIC BLOOD PRESSURE: 71 MMHG

## 2025-05-01 PROCEDURE — 99213 OFFICE O/P EST LOW 20 MIN: CPT | Mod: 25

## 2025-05-16 ENCOUNTER — APPOINTMENT (OUTPATIENT)
Dept: ANTEPARTUM | Facility: CLINIC | Age: 35
End: 2025-05-16
Payer: COMMERCIAL

## 2025-05-16 ENCOUNTER — ASOB RESULT (OUTPATIENT)
Age: 35
End: 2025-05-16

## 2025-05-16 PROCEDURE — 76805 OB US >/= 14 WKS SNGL FETUS: CPT

## 2025-05-28 ENCOUNTER — NON-APPOINTMENT (OUTPATIENT)
Age: 35
End: 2025-05-28

## 2025-05-29 ENCOUNTER — APPOINTMENT (OUTPATIENT)
Dept: OBGYN | Facility: CLINIC | Age: 35
End: 2025-05-29
Payer: COMMERCIAL

## 2025-05-29 VITALS
HEIGHT: 66 IN | SYSTOLIC BLOOD PRESSURE: 107 MMHG | BODY MASS INDEX: 27.48 KG/M2 | WEIGHT: 171 LBS | DIASTOLIC BLOOD PRESSURE: 70 MMHG

## 2025-05-29 PROCEDURE — 99212 OFFICE O/P EST SF 10 MIN: CPT

## 2025-05-30 ENCOUNTER — TRANSCRIPTION ENCOUNTER (OUTPATIENT)
Age: 35
End: 2025-05-30

## 2025-05-30 LAB
2ND TRIMESTER 4 SCREEN SERPL-IMP: NO
AFP ADJ MOM SERPL: 1.18
AFP INTERP SERPL-IMP: NORMAL
AFP INTERP SERPL-IMP: NORMAL
AFP MOM CUT-OFF: 2.5
AFP PERCENTILE: 69.3
AFP SERPL-ACNC: 47.15 NG/ML
CARBAMAZEPINE?: NO
CURRENT SMOKER: NORMAL
DIABETES STATUS PATIENT: NORMAL
GA: NORMAL
GESTATIONAL AGE METHOD: NORMAL
HX OF NTD NARR: NORMAL
MULTIPLE PREGNANCY: NORMAL
NEURAL TUBE DEFECT RISK FETUS: NORMAL
NEURAL TUBE DEFECT RISK POP: NORMAL
TEST PERFORMANCE INFO SPEC: NORMAL
VALPROIC ACID?: NORMAL

## 2025-06-03 ENCOUNTER — APPOINTMENT (OUTPATIENT)
Dept: PEDIATRIC CARDIOLOGY | Facility: CLINIC | Age: 35
End: 2025-06-03
Payer: COMMERCIAL

## 2025-06-03 PROCEDURE — 76820 UMBILICAL ARTERY ECHO: CPT

## 2025-06-03 PROCEDURE — 76827 ECHO EXAM OF FETAL HEART: CPT

## 2025-06-03 PROCEDURE — 93325 DOPPLER ECHO COLOR FLOW MAPG: CPT | Mod: 59

## 2025-06-03 PROCEDURE — 76825 ECHO EXAM OF FETAL HEART: CPT

## 2025-06-03 PROCEDURE — 99203 OFFICE O/P NEW LOW 30 MIN: CPT | Mod: 25

## 2025-06-06 ENCOUNTER — ASOB RESULT (OUTPATIENT)
Age: 35
End: 2025-06-06

## 2025-06-06 ENCOUNTER — APPOINTMENT (OUTPATIENT)
Dept: ANTEPARTUM | Facility: CLINIC | Age: 35
End: 2025-06-06
Payer: COMMERCIAL

## 2025-06-06 PROCEDURE — 76811 OB US DETAILED SNGL FETUS: CPT

## 2025-07-03 ENCOUNTER — APPOINTMENT (OUTPATIENT)
Dept: OBGYN | Facility: CLINIC | Age: 35
End: 2025-07-03
Payer: COMMERCIAL

## 2025-07-03 VITALS
WEIGHT: 171.38 LBS | SYSTOLIC BLOOD PRESSURE: 107 MMHG | DIASTOLIC BLOOD PRESSURE: 69 MMHG | BODY MASS INDEX: 27.66 KG/M2

## 2025-07-03 PROCEDURE — 99213 OFFICE O/P EST LOW 20 MIN: CPT | Mod: 25

## 2025-07-11 ENCOUNTER — APPOINTMENT (OUTPATIENT)
Dept: ANTEPARTUM | Facility: CLINIC | Age: 35
End: 2025-07-11

## 2025-07-11 ENCOUNTER — ASOB RESULT (OUTPATIENT)
Age: 35
End: 2025-07-11

## 2025-07-11 PROCEDURE — 76816 OB US FOLLOW-UP PER FETUS: CPT

## 2025-08-06 ENCOUNTER — NON-APPOINTMENT (OUTPATIENT)
Age: 35
End: 2025-08-06

## 2025-08-08 ENCOUNTER — ASOB RESULT (OUTPATIENT)
Age: 35
End: 2025-08-08

## 2025-08-08 ENCOUNTER — APPOINTMENT (OUTPATIENT)
Dept: OBGYN | Facility: CLINIC | Age: 35
End: 2025-08-08
Payer: COMMERCIAL

## 2025-08-08 ENCOUNTER — APPOINTMENT (OUTPATIENT)
Dept: ANTEPARTUM | Facility: CLINIC | Age: 35
End: 2025-08-08

## 2025-08-08 VITALS — DIASTOLIC BLOOD PRESSURE: 68 MMHG | SYSTOLIC BLOOD PRESSURE: 97 MMHG | WEIGHT: 176 LBS | BODY MASS INDEX: 28.41 KG/M2

## 2025-08-08 DIAGNOSIS — Z30.9 ENCOUNTER FOR CONTRACEPTIVE MANAGEMENT, UNSPECIFIED: ICD-10-CM

## 2025-08-08 DIAGNOSIS — O28.0 ABNORMAL HEMATOLOGICAL FINDING ON ANTENATAL SCREENING OF MOTHER: ICD-10-CM

## 2025-08-08 DIAGNOSIS — Z30.40 ENCOUNTER FOR SURVEILLANCE OF CONTRACEPTIVES, UNSPECIFIED: ICD-10-CM

## 2025-08-08 DIAGNOSIS — Z34.93 ENCOUNTER FOR SUPERVISION OF NORMAL PREGNANCY, UNSPECIFIED, THIRD TRIMESTER: ICD-10-CM

## 2025-08-08 PROCEDURE — 99213 OFFICE O/P EST LOW 20 MIN: CPT | Mod: 25

## 2025-08-08 PROCEDURE — 76816 OB US FOLLOW-UP PER FETUS: CPT

## 2025-08-12 ENCOUNTER — TRANSCRIPTION ENCOUNTER (OUTPATIENT)
Age: 35
End: 2025-08-12

## 2025-08-26 ENCOUNTER — TRANSCRIPTION ENCOUNTER (OUTPATIENT)
Age: 35
End: 2025-08-26

## 2025-08-28 ENCOUNTER — APPOINTMENT (OUTPATIENT)
Dept: OBGYN | Facility: CLINIC | Age: 35
End: 2025-08-28
Payer: COMMERCIAL

## 2025-08-28 VITALS — BODY MASS INDEX: 28.57 KG/M2 | SYSTOLIC BLOOD PRESSURE: 121 MMHG | WEIGHT: 77 LBS | DIASTOLIC BLOOD PRESSURE: 71 MMHG

## 2025-08-28 PROCEDURE — 81003 URINALYSIS AUTO W/O SCOPE: CPT | Mod: QW

## 2025-08-28 PROCEDURE — 99213 OFFICE O/P EST LOW 20 MIN: CPT | Mod: 25

## 2025-09-05 ENCOUNTER — APPOINTMENT (OUTPATIENT)
Dept: ANTEPARTUM | Facility: CLINIC | Age: 35
End: 2025-09-05

## 2025-09-05 ENCOUNTER — ASOB RESULT (OUTPATIENT)
Age: 35
End: 2025-09-05

## 2025-09-05 PROCEDURE — 76816 OB US FOLLOW-UP PER FETUS: CPT

## 2025-09-10 ENCOUNTER — APPOINTMENT (OUTPATIENT)
Dept: OBGYN | Facility: CLINIC | Age: 35
End: 2025-09-10
Payer: COMMERCIAL

## 2025-09-10 VITALS
SYSTOLIC BLOOD PRESSURE: 119 MMHG | HEIGHT: 66 IN | WEIGHT: 177.5 LBS | DIASTOLIC BLOOD PRESSURE: 74 MMHG | BODY MASS INDEX: 28.53 KG/M2

## 2025-09-10 DIAGNOSIS — R11.10 VOMITING, UNSPECIFIED: ICD-10-CM

## 2025-09-10 LAB
ALBUMIN SERPL ELPH-MCNC: 3.7 G/DL
ALP BLD-CCNC: 115 U/L
ALT SERPL-CCNC: 20 U/L
ANION GAP SERPL CALC-SCNC: 12 MMOL/L
AST SERPL-CCNC: 25 U/L
BASOPHILS # BLD AUTO: 0.01 K/UL
BASOPHILS NFR BLD AUTO: 0.2 %
BILIRUB SERPL-MCNC: 0.2 MG/DL
BUN SERPL-MCNC: 8 MG/DL
CALCIUM SERPL-MCNC: 9.2 MG/DL
CHLORIDE SERPL-SCNC: 104 MMOL/L
CO2 SERPL-SCNC: 20 MMOL/L
CREAT SERPL-MCNC: 0.43 MG/DL
EGFRCR SERPLBLD CKD-EPI 2021: 130 ML/MIN/1.73M2
EOSINOPHIL # BLD AUTO: 0.03 K/UL
EOSINOPHIL NFR BLD AUTO: 0.5 %
GLUCOSE SERPL-MCNC: 99 MG/DL
HCT VFR BLD CALC: 31.9 %
HGB BLD-MCNC: 10.4 G/DL
IMM GRANULOCYTES NFR BLD AUTO: 0.2 %
LYMPHOCYTES # BLD AUTO: 1.16 K/UL
LYMPHOCYTES NFR BLD AUTO: 20.4 %
MAN DIFF?: NORMAL
MCHC RBC-ENTMCNC: 31.2 PG
MCHC RBC-ENTMCNC: 32.6 G/DL
MCV RBC AUTO: 95.8 FL
MONOCYTES # BLD AUTO: 0.39 K/UL
MONOCYTES NFR BLD AUTO: 6.9 %
NEUTROPHILS # BLD AUTO: 4.08 K/UL
NEUTROPHILS NFR BLD AUTO: 71.8 %
PLATELET # BLD AUTO: 209 K/UL
POTASSIUM SERPL-SCNC: 3.8 MMOL/L
PROT SERPL-MCNC: 6 G/DL
RBC # BLD: 3.33 M/UL
RBC # FLD: 13.6 %
SODIUM SERPL-SCNC: 135 MMOL/L
URATE SERPL-MCNC: 3.4 MG/DL
WBC # FLD AUTO: 5.68 K/UL

## 2025-09-10 PROCEDURE — 99213 OFFICE O/P EST LOW 20 MIN: CPT | Mod: 25

## 2025-09-11 LAB
CREAT SPEC-SCNC: 82 MG/DL
CREAT/PROT UR: 0.1 RATIO
PROT UR-MCNC: 9 MG/DL